# Patient Record
Sex: MALE | Race: WHITE | NOT HISPANIC OR LATINO | Employment: FULL TIME | URBAN - METROPOLITAN AREA
[De-identification: names, ages, dates, MRNs, and addresses within clinical notes are randomized per-mention and may not be internally consistent; named-entity substitution may affect disease eponyms.]

---

## 2020-02-08 ENCOUNTER — OFFICE VISIT (OUTPATIENT)
Dept: RETAIL CLINIC | Facility: CLINIC | Age: 55
End: 2020-02-08

## 2020-02-08 DIAGNOSIS — H61.23 BILATERAL IMPACTED CERUMEN: ICD-10-CM

## 2020-02-08 DIAGNOSIS — H65.03 BILATERAL ACUTE SEROUS OTITIS MEDIA, RECURRENCE NOT SPECIFIED: Primary | ICD-10-CM

## 2020-02-08 PROCEDURE — 99203 OFFICE O/P NEW LOW 30 MIN: CPT | Performed by: NURSE PRACTITIONER

## 2020-02-08 RX ORDER — AZITHROMYCIN 250 MG/1
TABLET, FILM COATED ORAL
Qty: 6 TABLET | Refills: 0 | Status: SHIPPED | OUTPATIENT
Start: 2020-02-08 | End: 2022-03-16

## 2020-02-08 NOTE — PROGRESS NOTES
Subjective   Elan Freed is a 54 y.o. male.     History of Present Illness   Presents with bilateral ear fullness right > left, hard of hearing and ringing in ears for over 2 weeks. He has not tried anything for relief and he denies fever. He does have nasal congestion and nasal discharge which is thick and yellow at times.   The following portions of the patient's history were reviewed and updated as appropriate: allergies, current medications, past family history, past medical history, past surgical history and problem list.    Review of Systems   Constitutional: Negative.    HENT: Positive for ear pain (bilateral ear fullness, ringing and mild pain.), hearing loss, postnasal drip and rhinorrhea. Negative for ear discharge, facial swelling, sinus pressure, sinus pain and sore throat.    Eyes: Negative.    Skin: Negative.    Allergic/Immunologic: Negative.    Neurological: Negative.    Hematological: Negative.    Psychiatric/Behavioral: Negative.        Objective   Physical Exam   Constitutional: He is oriented to person, place, and time. Vital signs are normal. He appears well-developed and well-nourished. He is cooperative.  Non-toxic appearance. He does not have a sickly appearance.   HENT:   Head: Normocephalic and atraumatic.   Right Ear: External ear normal. Tympanic membrane is erythematous. A middle ear effusion is present. Decreased hearing is noted.   Left Ear: External ear normal. Tympanic membrane is erythematous. A middle ear effusion is present. Decreased hearing is noted.   Ears:    Nose: Mucosal edema and rhinorrhea present. Right sinus exhibits no maxillary sinus tenderness and no frontal sinus tenderness. Left sinus exhibits no maxillary sinus tenderness and no frontal sinus tenderness.   Mouth/Throat: Oropharynx is clear and moist and mucous membranes are normal. No oropharyngeal exudate, posterior oropharyngeal edema, posterior oropharyngeal erythema or tonsillar abscesses. Tonsils are 0 on the  right. Tonsils are 0 on the left.   Nasal congestion    Eyes: Conjunctivae are normal.   Cardiovascular: Normal rate, regular rhythm and normal heart sounds.   No murmur heard.  Pulmonary/Chest: Effort normal and breath sounds normal.   Lymphadenopathy:     He has no cervical adenopathy.   Neurological: He is alert and oriented to person, place, and time.   Skin: Skin is warm and dry.   Nursing note and vitals reviewed.      Assessment/Plan   Elan was seen today for ear fullness and hearing problem.    Diagnoses and all orders for this visit:    Bilateral acute serous otitis media, recurrence not specified    Bilateral impacted cerumen  -     Ear Cerumen Removal    Other orders  -     azithromycin (ZITHROMAX) 250 MG tablet; Take 2 tablets the first day, then 1 tablet daily for 4 days.        Ear Cerumen Removal  Date/Time: 2/8/2020 12:46 PM  Performed by: Palma Knott APRN  Authorized by: Palma Knott APRN   Consent: Verbal consent obtained.  Risks and benefits: risks, benefits and alternatives were discussed  Consent given by: patient  Patient understanding: patient states understanding of the procedure being performed  Patient consent: the patient's understanding of the procedure matches consent given  Procedure consent: procedure consent matches procedure scheduled  Relevant documents: relevant documents present and verified  Test results: test results available and properly labeled    Anesthesia:  Local Anesthetic: none  Location: both.  Patient tolerance: Patient tolerated the procedure well with no immediate complications  Comments: Both ear were cleared of cerumen and TM's visualized. Bilateral  TM's dull and erythremic.   Procedure type: instrumentation and irrigation   Sedation:  Patient sedated: no         follow up prn no improvement in 5-7 days   Follow up with PCP if hearing does not improve.   Recommend Flonase nasal spray as directed.   Claritin 10 mg. Po tran.   Coricidin HBP as directed  prn nasal congestion    Palma Knott, APRN

## 2020-02-08 NOTE — PATIENT INSTRUCTIONS

## 2022-03-16 ENCOUNTER — HOSPITAL ENCOUNTER (EMERGENCY)
Facility: HOSPITAL | Age: 57
Discharge: HOME | End: 2022-03-16
Attending: EMERGENCY MEDICINE | Admitting: EMERGENCY MEDICINE

## 2022-03-16 ENCOUNTER — APPOINTMENT (OUTPATIENT)
Dept: CT IMAGING | Facility: HOSPITAL | Age: 57
End: 2022-03-16

## 2022-03-16 ENCOUNTER — APPOINTMENT (OUTPATIENT)
Dept: CARDIOLOGY | Facility: HOSPITAL | Age: 57
End: 2022-03-16

## 2022-03-16 VITALS
RESPIRATION RATE: 18 BRPM | HEIGHT: 70 IN | SYSTOLIC BLOOD PRESSURE: 172 MMHG | BODY MASS INDEX: 26.48 KG/M2 | WEIGHT: 185 LBS | HEART RATE: 83 BPM | TEMPERATURE: 98.2 F | DIASTOLIC BLOOD PRESSURE: 100 MMHG | OXYGEN SATURATION: 96 %

## 2022-03-16 DIAGNOSIS — M79.605 LEFT LEG PAIN: ICD-10-CM

## 2022-03-16 DIAGNOSIS — Z86.16 HISTORY OF COVID-19: ICD-10-CM

## 2022-03-16 DIAGNOSIS — R10.12 LEFT UPPER QUADRANT ABDOMINAL PAIN: Primary | ICD-10-CM

## 2022-03-16 DIAGNOSIS — R59.1 LYMPHADENOPATHY: ICD-10-CM

## 2022-03-16 LAB
ALBUMIN SERPL-MCNC: 4.6 G/DL (ref 3.5–5.2)
ALBUMIN/GLOB SERPL: 1.8 G/DL
ALP SERPL-CCNC: 44 U/L (ref 39–117)
ALT SERPL W P-5'-P-CCNC: 27 U/L (ref 1–41)
ANION GAP SERPL CALCULATED.3IONS-SCNC: 10 MMOL/L (ref 5–15)
AST SERPL-CCNC: 22 U/L (ref 1–40)
BASOPHILS # BLD MANUAL: 0 10*3/MM3 (ref 0–0.2)
BASOPHILS NFR BLD MANUAL: 0 % (ref 0–1.5)
BH CV LOWER VASCULAR LEFT COMMON FEMORAL AUGMENT: NORMAL
BH CV LOWER VASCULAR LEFT COMMON FEMORAL COMPRESS: NORMAL
BH CV LOWER VASCULAR LEFT COMMON FEMORAL PHASIC: NORMAL
BH CV LOWER VASCULAR LEFT COMMON FEMORAL SPONT: NORMAL
BH CV LOWER VASCULAR LEFT DISTAL FEMORAL COMPRESS: NORMAL
BH CV LOWER VASCULAR LEFT GASTRONEMIUS COMPRESS: NORMAL
BH CV LOWER VASCULAR LEFT GREATER SAPH AK COMPRESS: NORMAL
BH CV LOWER VASCULAR LEFT GREATER SAPH BK COMPRESS: NORMAL
BH CV LOWER VASCULAR LEFT LESSER SAPH COMPRESS: NORMAL
BH CV LOWER VASCULAR LEFT MID FEMORAL AUGMENT: NORMAL
BH CV LOWER VASCULAR LEFT MID FEMORAL COMPRESS: NORMAL
BH CV LOWER VASCULAR LEFT MID FEMORAL PHASIC: NORMAL
BH CV LOWER VASCULAR LEFT MID FEMORAL SPONT: NORMAL
BH CV LOWER VASCULAR LEFT PERONEAL COMPRESS: NORMAL
BH CV LOWER VASCULAR LEFT POPLITEAL AUGMENT: NORMAL
BH CV LOWER VASCULAR LEFT POPLITEAL COMPRESS: NORMAL
BH CV LOWER VASCULAR LEFT POPLITEAL PHASIC: NORMAL
BH CV LOWER VASCULAR LEFT POPLITEAL SPONT: NORMAL
BH CV LOWER VASCULAR LEFT POSTERIOR TIBIAL COMPRESS: NORMAL
BH CV LOWER VASCULAR LEFT PROFUNDA FEMORAL COMPRESS: NORMAL
BH CV LOWER VASCULAR LEFT PROXIMAL FEMORAL COMPRESS: NORMAL
BH CV LOWER VASCULAR LEFT SAPHENOFEMORAL JUNCTION COMPRESS: NORMAL
BH CV LOWER VASCULAR RIGHT COMMON FEMORAL AUGMENT: NORMAL
BH CV LOWER VASCULAR RIGHT COMMON FEMORAL COMPRESS: NORMAL
BH CV LOWER VASCULAR RIGHT COMMON FEMORAL PHASIC: NORMAL
BH CV LOWER VASCULAR RIGHT COMMON FEMORAL SPONT: NORMAL
BILIRUB SERPL-MCNC: 0.3 MG/DL (ref 0–1.2)
BUN SERPL-MCNC: 16 MG/DL (ref 6–20)
BUN/CREAT SERPL: 17.4 (ref 7–25)
CALCIUM SPEC-SCNC: 9 MG/DL (ref 8.6–10.5)
CHLORIDE SERPL-SCNC: 105 MMOL/L (ref 98–107)
CO2 SERPL-SCNC: 26 MMOL/L (ref 22–29)
CREAT SERPL-MCNC: 0.92 MG/DL (ref 0.76–1.27)
DEPRECATED RDW RBC AUTO: 43.4 FL (ref 37–54)
EGFRCR SERPLBLD CKD-EPI 2021: 97.6 ML/MIN/1.73
EOSINOPHIL # BLD MANUAL: 0 10*3/MM3 (ref 0–0.4)
EOSINOPHIL NFR BLD MANUAL: 0 % (ref 0.3–6.2)
ERYTHROCYTE [DISTWIDTH] IN BLOOD BY AUTOMATED COUNT: 12.6 % (ref 12.3–15.4)
GLOBULIN UR ELPH-MCNC: 2.5 GM/DL
GLUCOSE SERPL-MCNC: 98 MG/DL (ref 65–99)
HCT VFR BLD AUTO: 42.1 % (ref 37.5–51)
HGB BLD-MCNC: 14.7 G/DL (ref 13–17.7)
LIPASE SERPL-CCNC: 35 U/L (ref 13–60)
LYMPHOCYTES # BLD MANUAL: 9.96 10*3/MM3 (ref 0.7–3.1)
LYMPHOCYTES NFR BLD MANUAL: 2 % (ref 5–12)
MAXIMAL PREDICTED HEART RATE: 164 BPM
MCH RBC QN AUTO: 32.8 PG (ref 26.6–33)
MCHC RBC AUTO-ENTMCNC: 34.9 G/DL (ref 31.5–35.7)
MCV RBC AUTO: 94 FL (ref 79–97)
MONOCYTES # BLD: 0.27 10*3/MM3 (ref 0.1–0.9)
NEUTROPHILS # BLD AUTO: 3.41 10*3/MM3 (ref 1.7–7)
NEUTROPHILS NFR BLD MANUAL: 23 % (ref 42.7–76)
NEUTS BAND NFR BLD MANUAL: 2 % (ref 0–5)
PLAT MORPH BLD: NORMAL
PLATELET # BLD AUTO: 209 10*3/MM3 (ref 140–450)
PMV BLD AUTO: 10.1 FL (ref 6–12)
POTASSIUM SERPL-SCNC: 4 MMOL/L (ref 3.5–5.2)
PROT SERPL-MCNC: 7.1 G/DL (ref 6–8.5)
RBC # BLD AUTO: 4.48 10*6/MM3 (ref 4.14–5.8)
RBC MORPH BLD: NORMAL
SMUDGE CELLS BLD QL SMEAR: ABNORMAL
SODIUM SERPL-SCNC: 141 MMOL/L (ref 136–145)
STRESS TARGET HR: 139 BPM
TROPONIN T SERPL-MCNC: <0.01 NG/ML (ref 0–0.03)
VARIANT LYMPHS NFR BLD MANUAL: 73 % (ref 19.6–45.3)
WBC NRBC COR # BLD: 13.65 10*3/MM3 (ref 3.4–10.8)

## 2022-03-16 PROCEDURE — 93971 EXTREMITY STUDY: CPT | Performed by: INTERNAL MEDICINE

## 2022-03-16 PROCEDURE — 93005 ELECTROCARDIOGRAM TRACING: CPT | Performed by: PHYSICIAN ASSISTANT

## 2022-03-16 PROCEDURE — 85025 COMPLETE CBC W/AUTO DIFF WBC: CPT | Performed by: PHYSICIAN ASSISTANT

## 2022-03-16 PROCEDURE — 99283 EMERGENCY DEPT VISIT LOW MDM: CPT

## 2022-03-16 PROCEDURE — 83690 ASSAY OF LIPASE: CPT | Performed by: PHYSICIAN ASSISTANT

## 2022-03-16 PROCEDURE — 85007 BL SMEAR W/DIFF WBC COUNT: CPT | Performed by: PHYSICIAN ASSISTANT

## 2022-03-16 PROCEDURE — 71275 CT ANGIOGRAPHY CHEST: CPT

## 2022-03-16 PROCEDURE — 93971 EXTREMITY STUDY: CPT

## 2022-03-16 PROCEDURE — 80053 COMPREHEN METABOLIC PANEL: CPT | Performed by: PHYSICIAN ASSISTANT

## 2022-03-16 PROCEDURE — 84484 ASSAY OF TROPONIN QUANT: CPT | Performed by: PHYSICIAN ASSISTANT

## 2022-03-16 PROCEDURE — 0 IOPAMIDOL PER 1 ML: Performed by: EMERGENCY MEDICINE

## 2022-03-16 RX ORDER — SODIUM CHLORIDE 0.9 % (FLUSH) 0.9 %
10 SYRINGE (ML) INJECTION AS NEEDED
Status: DISCONTINUED | OUTPATIENT
Start: 2022-03-16 | End: 2022-03-16 | Stop reason: HOSPADM

## 2022-03-16 RX ADMIN — IOPAMIDOL 80 ML: 755 INJECTION, SOLUTION INTRAVENOUS at 17:14

## 2022-03-16 NOTE — DISCHARGE INSTRUCTIONS
Rest.  Tylenol or Motrin as directed for pain.  Call [facility] to establish a PCP and arrange for follow up next week.  Return if worse.

## 2022-03-16 NOTE — ED PROVIDER NOTES
Start Aricept 5 mg one tablet with food daily for at least 30 days.    After 30 days, you can increase the dose to 10 mg daily with food.    Continue all of your other current medications (Lexapro, Losartan) as directed.    Return to clinic in 3 months for follow up appointment.      I will also schedule neuropsychological testing and a head MRI.    You should also follow up with Neurology.     Subjective   Mr. Freed is a 57 yr old male who was sent here by [facility] for further evaluation of left leg pain and left lower chest and left upper abdominal pain that began this morning.  He states he was positive for COVID 2 wks ago and [facility] was concerned he may have a PE and/or DVT.  The pt denies any cough or fever.  No shortness of breath.  No pleuritic pain.  He states his pain is mostly just below the ribs on the left.  When he has pain in the upper abdomen, his left calf and ankle ache at the same time.  He has no nausea, vomiting constipation, diarrhea or urinary symptoms.   No injury.  No known health issues.            Review of Systems   Constitutional: Negative for chills and fever.   HENT: Negative for sore throat.    Respiratory: Negative for cough and shortness of breath.    Cardiovascular: Positive for chest pain. Negative for palpitations and leg swelling.   Gastrointestinal: Positive for abdominal pain (LUQ).   Endocrine: Negative for polyuria.   Genitourinary: Negative for dysuria.   Musculoskeletal: Negative for back pain.        Left lower leg pain     Skin: Negative for rash.   Neurological: Negative for headaches.   Hematological: Negative.    Psychiatric/Behavioral: Negative.        No past medical history on file.    No Known Allergies    No past surgical history on file.    No family history on file.    Social History     Socioeconomic History   • Marital status:    Tobacco Use   • Smoking status: Former Smoker   • Smokeless tobacco: Current User     Types: Chew   • Tobacco comment: nicotine gum           Objective   Physical Exam  Constitutional:       General: He is not in acute distress.     Appearance: He is not ill-appearing, toxic-appearing or diaphoretic.   HENT:      Head: Normocephalic.      Nose: Nose normal.      Mouth/Throat:      Mouth: Mucous membranes are moist.   Eyes:      Conjunctiva/sclera: Conjunctivae normal.      Pupils: Pupils are equal, round, and reactive to  "light.   Cardiovascular:      Rate and Rhythm: Normal rate and regular rhythm.      Pulses: Normal pulses.      Heart sounds: Normal heart sounds. No murmur heard.  Pulmonary:      Effort: Pulmonary effort is normal.      Breath sounds: Normal breath sounds.   Chest:      Chest wall: No tenderness.   Abdominal:      General: Bowel sounds are normal.      Tenderness: There is no abdominal tenderness. There is no guarding or rebound.   Musculoskeletal:         General: No tenderness. Normal range of motion.      Cervical back: Normal range of motion and neck supple.      Right lower leg: No edema.      Left lower leg: No edema.      Comments: No current tenderness.  No swelling or redness.  Negative Patricio's   Skin:     General: Skin is warm and dry.   Neurological:      General: No focal deficit present.      Mental Status: He is alert.   Psychiatric:         Mood and Affect: Mood normal.         Procedures           ED Course      Pt appears in no distress.  No current symptoms.  Exam is benign.  I read his [facility] note and there is talk of chest pain into shoulder and left lower leg pain.  He was sent to r/o \"clot\".  Since he had COVID recently, I will get CTA chest and get doppler left leg and check labs.    17:40 EDT  Pt appears in no distress.  His doppler u/s is negative for DVT.  He has some non-specific lymph nodes in the groin.    CTA chest:  1.  Negative for pulmonary embolus.  2.  No acute cardiopulmonary process.  3.  Mild bilateral axillary lymphadenopathy. This is indeterminant. If  clinically warranted, these would appear amenable to percutaneous  Biopsy.    White count is 13.65, predominately lymphocytes.  No other concerning labs.      The lymphadenopathy may be related to his recent COVID positive status.  I will d/c home to follow up with his PCP for recheck next week.                                                     MDM    Final diagnoses:   Left upper quadrant abdominal pain   Left leg pain "   Lymphadenopathy   History of COVID-19       ED Disposition  ED Disposition     ED Disposition   Discharge    Condition   Stable    Comment   --             [facility] [place] [id number]  [phone]    call to establish a PCP and arrange for follow up next week    [facility] Emergency Department  [address] [facility] [address] [phone]    If symptoms worsen         Medication List      No changes were made to your prescriptions during this visit.          Andrew Rangel, PA  03/16/22 [id number]

## 2022-03-18 ENCOUNTER — OFFICE VISIT (OUTPATIENT)
Dept: INTERNAL MEDICINE | Facility: CLINIC | Age: 57
End: 2022-03-18

## 2022-03-18 VITALS
TEMPERATURE: 97.3 F | HEIGHT: 69 IN | HEART RATE: 88 BPM | BODY MASS INDEX: 28.71 KG/M2 | WEIGHT: 193.8 LBS | DIASTOLIC BLOOD PRESSURE: 88 MMHG | SYSTOLIC BLOOD PRESSURE: 136 MMHG | OXYGEN SATURATION: 98 %

## 2022-03-18 DIAGNOSIS — Z11.59 NEED FOR HEPATITIS C SCREENING TEST: ICD-10-CM

## 2022-03-18 DIAGNOSIS — Z12.5 SCREENING PSA (PROSTATE SPECIFIC ANTIGEN): ICD-10-CM

## 2022-03-18 DIAGNOSIS — F41.1 GAD (GENERALIZED ANXIETY DISORDER): Primary | Chronic | ICD-10-CM

## 2022-03-18 DIAGNOSIS — Z13.220 LIPID SCREENING: ICD-10-CM

## 2022-03-18 DIAGNOSIS — Z12.11 SCREEN FOR COLON CANCER: ICD-10-CM

## 2022-03-18 DIAGNOSIS — R59.0 AXILLARY LYMPHADENOPATHY: ICD-10-CM

## 2022-03-18 DIAGNOSIS — Z23 NEED FOR SHINGLES VACCINE: ICD-10-CM

## 2022-03-18 DIAGNOSIS — H93.13 TINNITUS OF BOTH EARS: ICD-10-CM

## 2022-03-18 DIAGNOSIS — U07.1 COVID-19 VIRUS INFECTION: ICD-10-CM

## 2022-03-18 DIAGNOSIS — R03.0 BORDERLINE HYPERTENSION: ICD-10-CM

## 2022-03-18 DIAGNOSIS — Z23 NEED FOR INFLUENZA VACCINATION: ICD-10-CM

## 2022-03-18 DIAGNOSIS — E66.3 OVERWEIGHT (BMI 25.0-29.9): ICD-10-CM

## 2022-03-18 PROBLEM — Z86.16 HISTORY OF COVID-19: Status: ACTIVE | Noted: 2022-03-18

## 2022-03-18 PROCEDURE — 90686 IIV4 VACC NO PRSV 0.5 ML IM: CPT | Performed by: STUDENT IN AN ORGANIZED HEALTH CARE EDUCATION/TRAINING PROGRAM

## 2022-03-18 PROCEDURE — 90472 IMMUNIZATION ADMIN EACH ADD: CPT | Performed by: STUDENT IN AN ORGANIZED HEALTH CARE EDUCATION/TRAINING PROGRAM

## 2022-03-18 PROCEDURE — 90750 HZV VACC RECOMBINANT IM: CPT | Performed by: STUDENT IN AN ORGANIZED HEALTH CARE EDUCATION/TRAINING PROGRAM

## 2022-03-18 PROCEDURE — 90471 IMMUNIZATION ADMIN: CPT | Performed by: STUDENT IN AN ORGANIZED HEALTH CARE EDUCATION/TRAINING PROGRAM

## 2022-03-18 PROCEDURE — 99215 OFFICE O/P EST HI 40 MIN: CPT | Performed by: STUDENT IN AN ORGANIZED HEALTH CARE EDUCATION/TRAINING PROGRAM

## 2022-03-18 RX ORDER — SERTRALINE HYDROCHLORIDE 25 MG/1
25 TABLET, FILM COATED ORAL DAILY
Qty: 30 TABLET | Refills: 1 | Status: SHIPPED | OUTPATIENT
Start: 2022-03-18 | End: 2022-04-25 | Stop reason: SDUPTHER

## 2022-03-18 RX ORDER — HYDROXYZINE HYDROCHLORIDE 25 MG/1
12.5-25 TABLET, FILM COATED ORAL 3 TIMES DAILY PRN
Qty: 60 TABLET | Refills: 1 | Status: SHIPPED | OUTPATIENT
Start: 2022-03-18 | End: 2022-04-25 | Stop reason: SDUPTHER

## 2022-03-18 NOTE — PROGRESS NOTES
Chief Complaint  Elan Freed is a 57 y.o. male presenting for elavated BP ([facility]) and Anxiety ([facility]  3/16/22).     From [place]. Lives with wife. Has son born 1992. Has own business - building swimming pools since 1987.     Patient has a past medical history of JACK and uncomplicated COVID-19.    History of Present Illness  Patient is here to establish care.  He has not had a primary care physician for long time.    Patient was found to have elevated blood pressure in the emergency room and urgent care.  He would like it rechecked.    Patient states he has a history of anxiety, but it has always been fairly mild.  More recently he has been experiencing worsening anxiety over the last 2 months.  Sometimes difficult to fall asleep, he continues to worry.  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  He states several family members have a history of anxiety.  He did try a quarter size Valium the other day from his brother, that he feels helped.  He prefers not to be on any medications, unless necessary.    Patient would also like colon cancer screening colonoscopy.  He would also like to do cholesterol check and prostate cancer screening PSA.    Patient also has a history of smoking, quit smoking last year, he smoked for about 25 years on average 1 pack a day.    Of note patient was diagnosed with COVID-19 2 weeks ago today, he had symptoms for 3-4 days prior to testing.  He is now recovering well, he did have some loose bowels that resolved a couple of days ago.  He was also evaluated in the emergency room to rule out DVT/PE, and was found to have axillary lymphadenopathy bilaterally.  Of note patient also noticed 1-2 weeks of tinnitus of his ears.  He has been exposed to loud noise frequently and has worked operating machinery.  He has not been using hearing protection.    The following portions of the patient's history were reviewed and updated as  "appropriate: allergies, current medications, past family history, past medical history, past social history, past surgical history and problem list.    PHQ-2 Total Score: 0    Objective  /88 (BP Location: Left arm, Patient Position: Sitting, Cuff Size: Adult)   Pulse 88   Temp 97.3 °F (36.3 °C) (Temporal)   Ht 176 cm (69.29\")   Wt 87.9 kg (193 lb 12.8 oz)   SpO2 98%   BMI 28.38 kg/m²     Physical Exam  Vitals reviewed.   Constitutional:       Appearance: Normal appearance.   HENT:      Head: Normocephalic and atraumatic.      Right Ear: Tympanic membrane, ear canal and external ear normal. There is no impacted cerumen.      Left Ear: Tympanic membrane, ear canal and external ear normal. There is no impacted cerumen.      Nose: No congestion.   Eyes:      Extraocular Movements: Extraocular movements intact.      Conjunctiva/sclera: Conjunctivae normal.   Cardiovascular:      Rate and Rhythm: Normal rate and regular rhythm.      Heart sounds: Normal heart sounds. No murmur heard.  Pulmonary:      Effort: Pulmonary effort is normal.      Breath sounds: Normal breath sounds.   Abdominal:      General: There is no distension.      Palpations: Abdomen is soft. There is no mass.      Tenderness: There is no abdominal tenderness.   Musculoskeletal:      Cervical back: Neck supple.      Right lower leg: No edema.      Left lower leg: No edema.   Skin:     General: Skin is warm and dry.   Neurological:      Mental Status: He is alert and oriented to person, place, and time. Mental status is at baseline.   Psychiatric:         Behavior: Behavior normal.         Thought Content: Thought content normal.         Assessment/Plan   1. JACK (generalized anxiety disorder)  PHQ-9 Total Score: 0  Anxiety that is affecting function.  No history of or concern for depression.  It appears to be partially related to stressors in his work, but he also has a family history of anxiety disorder.  He does not use alcohol.  Does not " use any drugs.  I recommend considering low-dose sertraline for at least a period of time to see if that can help.  He can also try Atarax for as needed use.  I have counseled him on side effects including GI and erectile dysfunction, and if any significant side effects we should consider other medication.  I have counseled him on recommendations for physical exercise, that can help reduce tension.  Also regular sleep schedule, doing things with family and friends to socialize.  He will return in about 5 weeks for follow-up visit/annual and we will see how this is working for him.  We will also do blood work as ordered.  - sertraline (Zoloft) 25 MG tablet; Take 1 tablet by mouth Daily.  Dispense: 30 tablet; Refill: 1  - hydrOXYzine (ATARAX) 25 MG tablet; Take 0.5-1 tablets by mouth 3 (Three) Times a Day As Needed for Anxiety (or sleep).  Dispense: 60 tablet; Refill: 1  - TSH Rfx On Abnormal To Free T4; Future    2. COVID-19 virus infection  Appears to be recovering well.    3. Axillary lymphadenopathy  Might consider repeat scan/ultrasound to see that the adenopathy resolves, I suspect this is in setting of his current/recent Covid infection.    4. Tinnitus of both ears  May be in setting of working in noisy environment, I strongly encouraged him to use hearing protection.  It may also be in setting of Covid.  We will see if this changes over the next few weeks.    5. Screening PSA (prostate specific antigen)  Pros and cons of screening discussed, including potential for false positive and work-up of elevated level that may prove to be no cancer versus early detection of a treatable and curable prostate cancer.  No family history of prostate cancer.  Patient would like to proceed with PSA screening.  - PSA Screen; Future    6. Screen for colon cancer  Family history of colon cancer, patient never had colonoscopy in the past.  He would like to start colon cancer screening with colonoscopy.  - Amb referral for  Screening Colonoscopy    7. Need for hepatitis C screening test  We will screen as recommended.  - Hepatitis C Antibody; Future    8. Lipid screening  Patient will return for fasting lipids.  - Lipid Panel; Future    9. Need for shingles vaccine  Administered today dose 1/2.  Next dose would be due in 2-6 months.  - Shingrix Vaccine    10. Need for influenza vaccination  Administered today.  - FluLaval/Fluarix/Fluzone >6 Months    11. Overweight (BMI 25.0-29.9)  Patient's Body mass index is 28.38 kg/m². indicating that he is overweight (BMI 25-29.9). Patient's (Body mass index is 28.38 kg/m².) indicates that they are overweight with health conditions that include none . Weight is newly identified. BMI is is above average; BMI management plan is completed. We discussed increasing exercise. .    12. Borderline hypertension  BP Readings from Last 3 Encounters:   03/18/22 136/88   03/16/22 172/100   03/16/22 172/100   Since high blood pressures may be in setting of acute illness.  He is currently still borderline.  We will recheck on follow-up visit.    Total time spent on chart review, charting and face-to-face with patient 65 minutes.    Return in about 5 weeks (around 4/22/2022) for Annual physical.    Future Appointments       Provider Department Center    4/22/2022 10:30 AM Cj Bermudez MD [facility] INTERNAL MEDICINE [facility]          Cj Bermudez MD  Family Medicine  03/18/2022

## 2022-03-21 LAB
QT INTERVAL: 408 MS
QTC INTERVAL: 420 MS

## 2022-03-28 DIAGNOSIS — Z12.11 SCREENING FOR COLON CANCER: Primary | ICD-10-CM

## 2022-04-25 ENCOUNTER — OFFICE VISIT (OUTPATIENT)
Dept: INTERNAL MEDICINE | Facility: CLINIC | Age: 57
End: 2022-04-25

## 2022-04-25 VITALS
TEMPERATURE: 98.6 F | SYSTOLIC BLOOD PRESSURE: 134 MMHG | WEIGHT: 196.2 LBS | BODY MASS INDEX: 29.06 KG/M2 | HEART RATE: 76 BPM | DIASTOLIC BLOOD PRESSURE: 82 MMHG | HEIGHT: 69 IN

## 2022-04-25 DIAGNOSIS — R03.0 BORDERLINE HYPERTENSION: ICD-10-CM

## 2022-04-25 DIAGNOSIS — F41.1 GAD (GENERALIZED ANXIETY DISORDER): Chronic | ICD-10-CM

## 2022-04-25 DIAGNOSIS — Z00.00 WELL ADULT EXAM: Primary | ICD-10-CM

## 2022-04-25 DIAGNOSIS — E66.3 OVERWEIGHT (BMI 25.0-29.9): ICD-10-CM

## 2022-04-25 DIAGNOSIS — N52.2 DRUG-INDUCED ERECTILE DYSFUNCTION: ICD-10-CM

## 2022-04-25 PROCEDURE — 99396 PREV VISIT EST AGE 40-64: CPT | Performed by: STUDENT IN AN ORGANIZED HEALTH CARE EDUCATION/TRAINING PROGRAM

## 2022-04-25 RX ORDER — SERTRALINE HYDROCHLORIDE 25 MG/1
25 TABLET, FILM COATED ORAL DAILY
Qty: 30 TABLET | Refills: 1 | Status: SHIPPED | OUTPATIENT
Start: 2022-04-25 | End: 2023-02-27 | Stop reason: HOSPADM

## 2022-04-25 RX ORDER — HYDROXYZINE HYDROCHLORIDE 25 MG/1
12.5-25 TABLET, FILM COATED ORAL 3 TIMES DAILY PRN
Qty: 60 TABLET | Refills: 1 | Status: SHIPPED | OUTPATIENT
Start: 2022-04-25 | End: 2022-10-23 | Stop reason: SDUPTHER

## 2022-04-25 NOTE — PROGRESS NOTES
"Chief Complaint  Elan Freed is a 57 y.o. male presenting for Annual Exam, Fatigue, and URI (Fatigued, no energy ,  residual of Covid in Mar. ).     From [place]. Lives with wife. Has son born 1992. Has own business - building swimming pools since 1987.      Patient has a past medical history of JACK and uncomplicated COVID-19 (3/16/22).    History of Present Illness  Patient is here for annual.    Patient was diagnosed with COVID-19 on 3/16/2022, he is still having symptoms in form of fatigue, decreased taste and smell, but he is having good.  Symptoms of pain and further between the symptomatic episodes.    He is scheduled for colonoscopy Thursday this week, unfortunately his prep was not received at the pharmacy, so I am recommended he reaches out so they can resend it for him.    He feels that his anxiety has improved significantly after starting on Zoloft, he also has taken Atarax with good response as needed.  Unfortunately he has noticed some erectile dysfunction, but not to the point where he wants to stop the medication yet.    The following portions of the patient's history were reviewed and updated as appropriate: allergies, current medications, past family history, past medical history, past social history, past surgical history and problem list.    Objective  /82 (BP Location: Left arm, Patient Position: Sitting, Cuff Size: Adult)   Pulse 76   Temp 98.6 °F (37 °C) (Temporal)   Ht 175.3 cm (69\")   Wt 89 kg (196 lb 3.2 oz)   BMI 28.97 kg/m²     Physical Exam  Vitals reviewed.   Constitutional:       Appearance: Normal appearance.   HENT:      Head: Normocephalic and atraumatic.      Nose: No congestion.   Eyes:      Extraocular Movements: Extraocular movements intact.      Conjunctiva/sclera: Conjunctivae normal.   Cardiovascular:      Rate and Rhythm: Normal rate and regular rhythm.      Heart sounds: Normal heart sounds. No murmur heard.  Pulmonary:      Effort: Pulmonary effort is " normal.      Breath sounds: Normal breath sounds.   Musculoskeletal:      Cervical back: Neck supple.      Right lower leg: No edema.      Left lower leg: No edema.   Skin:     General: Skin is warm and dry.   Neurological:      Mental Status: He is alert and oriented to person, place, and time. Mental status is at baseline.   Psychiatric:         Behavior: Behavior normal.         Thought Content: Thought content normal.         Assessment/Plan   1. Well adult exam  Patient still having decreased energy after his COVID.  Counseled on recommendation for COVID-vaccine about 3 months after he was diagnosed.  He did not have vaccine in the past.  Patient did not go for blood work, he did not realize that he was supposed to come back for fasting blood work and thought they would call him for an appointment.  I told him he can go to any [facility] fasting and get the blood work done and I will update him when I get the results.  Patient will go for colonoscopy this week as planned.    2. JACK (generalized anxiety disorder)  3. Drug-induced erectile dysfunction  Significant improvement of anxiety, but unfortunately side effect of erectile dysfunction, likely related to sertraline.  Patient wants to continue on his current dose to see if this gets better.  He will return in 6 weeks for follow-up with me and we can see if we need to change to another medication at that time.  - sertraline (Zoloft) 25 MG tablet; Take 1 tablet by mouth Daily.  Dispense: 30 tablet; Refill: 1  - hydrOXYzine (ATARAX) 25 MG tablet; Take 0.5-1 tablets by mouth 3 (Three) Times a Day As Needed for Anxiety (or sleep).  Dispense: 60 tablet; Refill: 1    4. Borderline hypertension  BP Readings from Last 3 Encounters:   04/25/22 134/82   04/12/22 148/91   03/18/22 136/88   Blood pressure now below 140/90.  We will continue to monitor.    5. Overweight (BMI 25.0-29.9)  BMI is above normal parameters. Recommendations: exercise counseling/recommendations  and nutrition counseling/recommendations       Return in about 6 weeks (around 6/6/2022) for Recheck.    Future Appointments       Provider Department Center    4/28/2022 12:30 PM Mendoza Dye MD [facility] GASTROENTEROLOGY [facility]    6/8/2022 2:30 PM Cj Bermudez MD [facility] INTERNAL MEDICINE [facility]            Cj Bermudez MD  Family Medicine  04/25/2022

## 2022-04-28 ENCOUNTER — OUTSIDE FACILITY SERVICE (OUTPATIENT)
Dept: GASTROENTEROLOGY | Facility: CLINIC | Age: 57
End: 2022-04-28

## 2022-04-28 PROCEDURE — 45388 COLONOSCOPY W/ABLATION: CPT | Performed by: INTERNAL MEDICINE

## 2022-04-28 PROCEDURE — 88305 TISSUE EXAM BY PATHOLOGIST: CPT | Performed by: INTERNAL MEDICINE

## 2022-04-28 PROCEDURE — 45385 COLONOSCOPY W/LESION REMOVAL: CPT | Performed by: INTERNAL MEDICINE

## 2022-04-29 ENCOUNTER — TELEPHONE (OUTPATIENT)
Dept: GASTROENTEROLOGY | Facility: CLINIC | Age: 57
End: 2022-04-29

## 2022-04-29 ENCOUNTER — LAB REQUISITION (OUTPATIENT)
Dept: LAB | Facility: HOSPITAL | Age: 57
End: 2022-04-29

## 2022-04-29 DIAGNOSIS — Z12.11 ENCOUNTER FOR SCREENING FOR MALIGNANT NEOPLASM OF COLON: ICD-10-CM

## 2022-04-29 NOTE — TELEPHONE ENCOUNTER
Pain should resolve in the next 1 to 2 days.  Can follow-up bland diet.  If he has rectal bleeding (more than just a scant amount), he should present to the ED for evaluation.

## 2022-05-02 LAB
CYTO UR: NORMAL
LAB AP CASE REPORT: NORMAL
LAB AP CLINICAL INFORMATION: NORMAL
PATH REPORT.FINAL DX SPEC: NORMAL
PATH REPORT.GROSS SPEC: NORMAL

## 2022-05-03 ENCOUNTER — TELEPHONE (OUTPATIENT)
Dept: GASTROENTEROLOGY | Facility: CLINIC | Age: 57
End: 2022-05-03

## 2022-05-03 NOTE — TELEPHONE ENCOUNTER
----- Message from Mendoza Dye MD sent at 5/2/2022  3:46 PM EDT -----  Please let Elan know that the polyps were benign.  Because of the number I still recommend a follow-up in 3 years time.

## 2022-05-03 NOTE — TELEPHONE ENCOUNTER
I TRIED TO CALL [facility] TO GIVE BIOPSY RESULTS. NO ANSWER; MAILBOX IS FULL. I WILL SEND RESULT LETTER TO MY CHART AND THE MAIL.

## 2022-06-13 ENCOUNTER — OFFICE VISIT (OUTPATIENT)
Dept: INTERNAL MEDICINE | Facility: CLINIC | Age: 57
End: 2022-06-13

## 2022-06-13 VITALS
DIASTOLIC BLOOD PRESSURE: 86 MMHG | WEIGHT: 200 LBS | HEIGHT: 69 IN | BODY MASS INDEX: 29.62 KG/M2 | TEMPERATURE: 97.8 F | HEART RATE: 72 BPM | SYSTOLIC BLOOD PRESSURE: 122 MMHG

## 2022-06-13 DIAGNOSIS — M25.511 ARTHRALGIA OF RIGHT ACROMIOCLAVICULAR JOINT: ICD-10-CM

## 2022-06-13 DIAGNOSIS — F41.1 GAD (GENERALIZED ANXIETY DISORDER): Chronic | ICD-10-CM

## 2022-06-13 DIAGNOSIS — R59.0 AXILLARY LYMPHADENOPATHY: ICD-10-CM

## 2022-06-13 DIAGNOSIS — R93.89 ABNORMAL CT OF THE CHEST: ICD-10-CM

## 2022-06-13 DIAGNOSIS — N52.2 DRUG-INDUCED ERECTILE DYSFUNCTION: ICD-10-CM

## 2022-06-13 DIAGNOSIS — M65.331 TRIGGER MIDDLE FINGER OF RIGHT HAND: Primary | ICD-10-CM

## 2022-06-13 DIAGNOSIS — R03.0 BORDERLINE HYPERTENSION: ICD-10-CM

## 2022-06-13 PROCEDURE — 99214 OFFICE O/P EST MOD 30 MIN: CPT | Performed by: STUDENT IN AN ORGANIZED HEALTH CARE EDUCATION/TRAINING PROGRAM

## 2022-06-13 NOTE — PROGRESS NOTES
"Chief Complaint  Elan Freed is a 57 y.o. male presenting for Hypertension (Borderline  f/u ) and Pain (Right shoulder and hand also numbness in arm).     From [place]. Lives with wife. Has son born 1992. Has own business - building swimming pools since 1987. Twin brother passed unexpectedly May 2022.     Patient has a past medical history of JACK and uncomplicated COVID-19 (3/16/22).    History of Present Illness  Twin brother passed unexpectedly May 2022.  Patient is upset and shocked about his mother's unexpected passing, they found him dead in the day.    He states his sleep has been affected, he is more anxious.  However he feels that Zoloft has helped him well, but still having some erectile dysfunction.  He would rather continue on Zoloft for now.  He also uses Atarax as needed to help control anxiety and sleep.    Patient has noticed right and middle and partially index finger catching.  He has a history of trigger finger from the past.  He would like to see hand surgeon for evaluation/injection.    Over last 3-4 months he has also been experiencing worsening right shoulder pain.  Hurts when laying on.  No injury.  Painful when moving.    The following portions of the patient's history were reviewed and updated as appropriate: allergies, current medications, past family history, past medical history, past social history, past surgical history and problem list.    Objective  /86 (BP Location: Left arm, Patient Position: Sitting, Cuff Size: Adult)   Pulse 72   Temp 97.8 °F (36.6 °C) (Temporal)   Ht 175.4 cm (69.04\")   Wt 90.7 kg (200 lb)   BMI 29.50 kg/m²     Physical Exam  Vitals reviewed.   Constitutional:       Appearance: Normal appearance.   HENT:      Head: Normocephalic and atraumatic.      Nose: No congestion.   Eyes:      Extraocular Movements: Extraocular movements intact.      Conjunctiva/sclera: Conjunctivae normal.   Cardiovascular:      Rate and Rhythm: Normal rate and regular " rhythm.      Heart sounds: Normal heart sounds. No murmur heard.  Pulmonary:      Effort: Pulmonary effort is normal.      Breath sounds: Normal breath sounds.   Chest:   Breasts:      Right: No axillary adenopathy or supraclavicular adenopathy.      Left: No axillary adenopathy or supraclavicular adenopathy.       Abdominal:      General: There is no distension.      Palpations: Abdomen is soft. There is no mass.      Tenderness: There is no abdominal tenderness.   Musculoskeletal:         General: Tenderness present.      Cervical back: Neck supple.      Right lower leg: No edema.      Left lower leg: No edema.      Comments: Right shoulder: Tender on palpation around AC joint.  Adduction test causes pain around the AC joint.  He is able to elevate arm above the head.  He has fairly good ROM for external and internal rotation.   Lymphadenopathy:      Cervical: No cervical adenopathy.      Upper Body:      Right upper body: No supraclavicular or axillary adenopathy.      Left upper body: No supraclavicular or axillary adenopathy.   Skin:     General: Skin is warm and dry.   Neurological:      Mental Status: He is alert and oriented to person, place, and time. Mental status is at baseline.   Psychiatric:         Behavior: Behavior normal.         Thought Content: Thought content normal.         Assessment/Plan   1. Trigger middle finger of right hand  Will refer to hand surgery for evaluation.  - Ambulatory Referral to Hand Surgery    2. Arthralgia of right acromioclavicular joint  We will do x-ray.  Suspect possible osteoarthritis.  Will refer to Ortho for evaluation for possible injection.  - XR Shoulder 2+ View Right; Future  - Ambulatory Referral to Orthopedic Surgery    3. Borderline hypertension  BP Readings from Last 3 Encounters:   06/13/22 122/86   04/25/22 134/82   04/12/22 148/91   Blood pressure now improved.  We will continue to monitor.    4. JACK (generalized anxiety disorder)  5. Drug-induced erectile  dysfunction  Patient will continue on Zoloft for now.  He is experiencing side effects with the ED likely related to this medication, but would like to continue for now.  Recommend transitioning to another medication when he is interested..    6. Axillary lymphadenopathy  7. Abnormal CT of the chest  Demonstrated on CT chest in March.  Could be infectious, and I was not able to find any lymphadenopathy today.  We will do ultrasound to rule out any deeper ongoing lymphadenopathy.  If this is resolved he will not need any further follow-up for now.  - US Axilla Bilateral; Future      Return in about 3 months (around 9/13/2022) for Recheck.    Future Appointments       Provider Department Center    9/13/2022 10:30 AM Cj Bermudez MD [facility] INTERNAL MEDICINE [facility]          Cj Bermudez MD  Family Medicine  06/13/2022

## 2022-10-23 DIAGNOSIS — F41.1 GAD (GENERALIZED ANXIETY DISORDER): Chronic | ICD-10-CM

## 2022-10-24 RX ORDER — HYDROXYZINE HYDROCHLORIDE 25 MG/1
TABLET, FILM COATED ORAL
Qty: 60 TABLET | Refills: 1 | Status: SHIPPED | OUTPATIENT
Start: 2022-10-24 | End: 2023-02-27 | Stop reason: SDUPTHER

## 2022-10-24 RX ORDER — HYDROXYZINE HYDROCHLORIDE 25 MG/1
12.5-25 TABLET, FILM COATED ORAL 3 TIMES DAILY PRN
Qty: 60 TABLET | Refills: 1 | Status: SHIPPED | OUTPATIENT
Start: 2022-10-24 | End: 2022-10-24 | Stop reason: SDUPTHER

## 2022-10-24 NOTE — TELEPHONE ENCOUNTER
Rx Refill Note  Requested Prescriptions     Pending Prescriptions Disp Refills   • hydrOXYzine (ATARAX) 25 MG tablet 60 tablet 1     Sig: Take 0.5-1 tablets by mouth 3 (Three) Times a Day As Needed for Anxiety (or sleep).      Last office visit with prescribing clinician: 6/13/2022      Next office visit with prescribing clinician: 10/23/2022            Ana Garvey LPN  10/24/22, 08:46 EDT

## 2022-10-24 NOTE — TELEPHONE ENCOUNTER
Rx Refill Note  Requested Prescriptions     Pending Prescriptions Disp Refills   • hydrOXYzine (ATARAX) 25 MG tablet [Pharmacy Med Name: HYDROXYZINE HCL 25MG TABS (WHITE)] 60 tablet 1     Sig: TAKE 1/2 TO 1 TABLET BY MOUTH THREE TIMES DAILY AS NEEDED FOR ANXIETY OR SLEEP      Last office visit with prescribing clinician: 6/13/2022      Next office visit with prescribing clinician: 11/9/2022            Ana Garvey LPN  10/24/22, 08:53 EDT

## 2023-02-27 ENCOUNTER — OFFICE VISIT (OUTPATIENT)
Dept: INTERNAL MEDICINE | Facility: CLINIC | Age: 58
End: 2023-02-27
Payer: MEDICAID

## 2023-02-27 VITALS
HEART RATE: 72 BPM | HEIGHT: 70 IN | BODY MASS INDEX: 31.92 KG/M2 | TEMPERATURE: 98.2 F | SYSTOLIC BLOOD PRESSURE: 136 MMHG | WEIGHT: 223 LBS | DIASTOLIC BLOOD PRESSURE: 86 MMHG

## 2023-02-27 DIAGNOSIS — F41.1 GAD (GENERALIZED ANXIETY DISORDER): Chronic | ICD-10-CM

## 2023-02-27 DIAGNOSIS — R09.81 NASAL CONGESTION: ICD-10-CM

## 2023-02-27 DIAGNOSIS — M25.511 ARTHRALGIA OF RIGHT ACROMIOCLAVICULAR JOINT: ICD-10-CM

## 2023-02-27 DIAGNOSIS — J34.89 NASAL STENOSIS: Primary | ICD-10-CM

## 2023-02-27 PROCEDURE — 99214 OFFICE O/P EST MOD 30 MIN: CPT | Performed by: STUDENT IN AN ORGANIZED HEALTH CARE EDUCATION/TRAINING PROGRAM

## 2023-02-27 RX ORDER — AZELASTINE 1 MG/ML
2 SPRAY, METERED NASAL 2 TIMES DAILY
Qty: 30 ML | Refills: 2 | Status: SHIPPED | OUTPATIENT
Start: 2023-02-27

## 2023-02-27 RX ORDER — HYDROXYZINE HYDROCHLORIDE 25 MG/1
TABLET, FILM COATED ORAL
Qty: 60 TABLET | Refills: 1 | OUTPATIENT
Start: 2023-02-27

## 2023-02-27 RX ORDER — SERTRALINE HYDROCHLORIDE 25 MG/1
25 TABLET, FILM COATED ORAL DAILY
Qty: 30 TABLET | Refills: 1 | OUTPATIENT
Start: 2023-02-27

## 2023-02-27 RX ORDER — HYDROXYZINE HYDROCHLORIDE 25 MG/1
25 TABLET, FILM COATED ORAL EVERY 8 HOURS PRN
Qty: 60 TABLET | Refills: 4 | Status: SHIPPED | OUTPATIENT
Start: 2023-02-27

## 2023-02-27 NOTE — PROGRESS NOTES
"Chief Complaint  Elan Freed is a 57 y.o. male presenting for Nasal Congestion (Right side , SOB sometimes ).     From [place]. Lives with wife. Has son born 1992. Has own business - building swimming pools since 1987. Twin brother passed unexpectedly May 2022.     Patient has a past medical history of JACK and uncomplicated COVID-19 (3/16/22).    History of Present Illness  Patient is here for evaluation of right-sided nasal congestion and blockage for about 2 months.  He was in [place] for trip and symptoms improved, but recurred again when he returned to [place].  He was on 1 week course of antibiotics 2-3 weeks ago and felt better for a while, but then started having symptoms again.  He was also recommended to start taking Flonase, that he has been taking for about 2 weeks.  It has helped somewhat.  Symptoms are worse when laying down.  He also reports some pressure over his right frontal area.  He tells me he can easily inhale, but when exhaling it feels like a flap partially blocking the flow of air of his right nostril.    Patient continues with pain of his left shoulder.  He never got to do the x-ray last year.  He would like me to order a new one for him.    He lost his brother unexpectedly last year, and he has been struggling with anxieties.  He feels the hydroxyzine helps and would like refill    The following portions of the patient's history were reviewed and updated as appropriate: allergies, current medications, past family history, past medical history, past social history, past surgical history and problem list.    Objective  /86 (BP Location: Left arm, Patient Position: Sitting, Cuff Size: Adult)   Pulse 72   Temp 98.2 °F (36.8 °C) (Temporal)   Ht 177.8 cm (70\")   Wt 101 kg (223 lb)   BMI 32.00 kg/m²     Physical Exam  Constitutional:       Appearance: Normal appearance.   HENT:      Nose: Congestion present.      Comments: His mucous membranes are somewhat inflamed.  I am not " able to visualize any polyps, and the nasal passages are fairly narrow.  He sounds congested.     Mouth/Throat:      Mouth: Mucous membranes are moist.      Pharynx: Oropharynx is clear. No posterior oropharyngeal erythema.   Eyes:      Extraocular Movements: Extraocular movements intact.      Conjunctiva/sclera: Conjunctivae normal.   Pulmonary:      Effort: Pulmonary effort is normal. No respiratory distress.   Musculoskeletal:      Cervical back: Neck supple. No tenderness.   Lymphadenopathy:      Cervical: No cervical adenopathy.   Skin:     General: Skin is warm and dry.   Neurological:      Mental Status: He is alert and oriented to person, place, and time. Mental status is at baseline.   Psychiatric:         Behavior: Behavior normal.         Thought Content: Thought content normal.         Assessment/Plan   1. Nasal stenosis  2. Nasal congestion  New problem with feeling of blockage of the right side of his nose.  Recommend continuing on Flonase, will also add azelastine nasal spray.  He does have a history of smoking, quit smoking 2 years ago.  He has noticed any bleeding.  However due to persistent symptoms it is appropriate to refer to ENT to rule out polyps versus mass of the right side.  - azelastine (ASTELIN) 0.1 % nasal spray; 2 sprays into the nostril(s) as directed by provider 2 (Two) Times a Day. Use in each nostril as directed  Dispense: 30 mL; Refill: 2  - Ambulatory Referral to ENT (Otolaryngology)    3. JACK (generalized anxiety disorder)  Has gotten somewhat worse, but feels help with hydroxyzine.  If needed we can also increase the dose.  - hydrOXYzine (ATARAX) 25 MG tablet; Take 1 tablet by mouth Every 8 (Eight) Hours As Needed for Anxiety (and sleep).  Dispense: 60 tablet; Refill: 4    4. Arthralgia of right acromioclavicular joint  We ordered x-ray.  - XR Shoulder 2+ View Left; Future      Return in about 2 months (around 4/28/2023) for Annual physical.    Future Appointments       Provider  Department Center    5/2/2023 1:15 PM Cj Bermudez MD [facility] INTERNAL MEDICINE [facility]          Cj Bermudez MD  Family Medicine  02/27/2023

## 2023-05-23 DIAGNOSIS — F41.1 GAD (GENERALIZED ANXIETY DISORDER): Chronic | ICD-10-CM

## 2023-05-24 RX ORDER — HYDROXYZINE HYDROCHLORIDE 25 MG/1
TABLET, FILM COATED ORAL
Qty: 60 TABLET | Refills: 2 | Status: SHIPPED | OUTPATIENT
Start: 2023-05-24

## 2023-05-24 NOTE — TELEPHONE ENCOUNTER
Rx Refill Note  Requested Prescriptions     Pending Prescriptions Disp Refills   • hydrOXYzine (ATARAX) 25 MG tablet [Pharmacy Med Name: HYDROXYZINE HCL 25MG TABS (WHITE)] 60 tablet 4     Sig: TAKE 1/2 TO 1 TABLET BY MOUTH THREE TIMES DAILY AS NEEDED FOR ANXIETY OR SLEEP      Last office visit with prescribing clinician: 2/27/2023   Last telemedicine visit with prescribing clinician: Visit date not found   Next office visit with prescribing clinician: Visit date not found                         Would you like a call back once the refill request has been completed: [] Yes [] No    If the office needs to give you a call back, can they leave a voicemail: [] Yes [] No    Ana Garvey LPN  05/24/23, 07:42 EDT

## 2023-07-26 DIAGNOSIS — F41.1 GAD (GENERALIZED ANXIETY DISORDER): Chronic | ICD-10-CM

## 2023-07-26 RX ORDER — HYDROXYZINE HYDROCHLORIDE 25 MG/1
TABLET, FILM COATED ORAL
Qty: 60 TABLET | Refills: 2 | Status: SHIPPED | OUTPATIENT
Start: 2023-07-26

## 2023-07-26 NOTE — TELEPHONE ENCOUNTER
Rx Refill Note  Requested Prescriptions     Pending Prescriptions Disp Refills    hydrOXYzine (ATARAX) 25 MG tablet [Pharmacy Med Name: HYDROXYZINE HCL 25MG TABS (WHITE)] 60 tablet 2     Sig: TAKE 1/2 TO 1 TABLET BY MOUTH THREE TIMES DAILY AS NEEDED FOR ANXIETY OR SLEEP      Last office visit with prescribing clinician: 2/27/2023   Last telemedicine visit with prescribing clinician: Visit date not found   Next office visit with prescribing clinician: Visit date not found                         Would you like a call back once the refill request has been completed: [] Yes [] No    If the office needs to give you a call back, can they leave a voicemail: [] Yes [] No    Rogelio Guerra MA  07/26/23, 14:45 EDT

## 2023-10-31 ENCOUNTER — TELEPHONE (OUTPATIENT)
Dept: INTERNAL MEDICINE | Facility: CLINIC | Age: 58
End: 2023-10-31
Payer: MEDICAID

## 2023-10-31 NOTE — TELEPHONE ENCOUNTER
PATIENT REQUESTING NEW X-RAY ORDER FOR HIS SHOULDERS. HE HAD A PREVIOUS X-RAY THAT HE DID NOT COMPLETE. (REFER TO LETTER SENT 8/23/2023. PLEASE CALL PATIENT WHEN SENT.

## 2023-10-31 NOTE — TELEPHONE ENCOUNTER
I had previously ordered him for an x-ray of his shoulder.  When I saw him in February he asked for a new order, and clearly did not get it done.  He also was supposed to come in for follow-up.  So at this time I will not order a new x-ray until he has come in for follow-up.

## 2023-11-20 ENCOUNTER — LAB (OUTPATIENT)
Dept: LAB | Facility: HOSPITAL | Age: 58
End: 2023-11-20
Payer: COMMERCIAL

## 2023-11-20 ENCOUNTER — HOSPITAL ENCOUNTER (OUTPATIENT)
Dept: GENERAL RADIOLOGY | Facility: HOSPITAL | Age: 58
Discharge: HOME | End: 2023-11-20
Admitting: STUDENT IN AN ORGANIZED HEALTH CARE EDUCATION/TRAINING PROGRAM
Payer: COMMERCIAL

## 2023-11-20 ENCOUNTER — OFFICE VISIT (OUTPATIENT)
Dept: INTERNAL MEDICINE | Facility: CLINIC | Age: 58
End: 2023-11-20
Payer: COMMERCIAL

## 2023-11-20 VITALS
HEIGHT: 70 IN | HEART RATE: 68 BPM | WEIGHT: 236.6 LBS | BODY MASS INDEX: 33.87 KG/M2 | DIASTOLIC BLOOD PRESSURE: 94 MMHG | SYSTOLIC BLOOD PRESSURE: 152 MMHG | TEMPERATURE: 98.2 F

## 2023-11-20 DIAGNOSIS — Z23 NEED FOR INFLUENZA VACCINATION: ICD-10-CM

## 2023-11-20 DIAGNOSIS — R63.5 WEIGHT GAIN: ICD-10-CM

## 2023-11-20 DIAGNOSIS — Z13.220 LIPID SCREENING: ICD-10-CM

## 2023-11-20 DIAGNOSIS — Z11.59 NEED FOR HEPATITIS C SCREENING TEST: ICD-10-CM

## 2023-11-20 DIAGNOSIS — G89.29 CHRONIC PAIN OF BOTH SHOULDERS: ICD-10-CM

## 2023-11-20 DIAGNOSIS — F41.1 GAD (GENERALIZED ANXIETY DISORDER): Chronic | ICD-10-CM

## 2023-11-20 DIAGNOSIS — Z13.1 DIABETES MELLITUS SCREENING: ICD-10-CM

## 2023-11-20 DIAGNOSIS — F41.1 GAD (GENERALIZED ANXIETY DISORDER): ICD-10-CM

## 2023-11-20 DIAGNOSIS — I10 ESSENTIAL HYPERTENSION: Chronic | ICD-10-CM

## 2023-11-20 DIAGNOSIS — E66.9 OBESITY, CLASS I, BMI 30-34.9: ICD-10-CM

## 2023-11-20 DIAGNOSIS — Z87.891 PERSONAL HISTORY OF TOBACCO USE, PRESENTING HAZARDS TO HEALTH: ICD-10-CM

## 2023-11-20 DIAGNOSIS — M25.511 CHRONIC PAIN OF BOTH SHOULDERS: ICD-10-CM

## 2023-11-20 DIAGNOSIS — M25.512 CHRONIC PAIN OF BOTH SHOULDERS: ICD-10-CM

## 2023-11-20 DIAGNOSIS — Z00.00 WELL ADULT EXAM: Primary | ICD-10-CM

## 2023-11-20 DIAGNOSIS — K21.9 GASTROESOPHAGEAL REFLUX DISEASE WITHOUT ESOPHAGITIS: Chronic | ICD-10-CM

## 2023-11-20 PROBLEM — E66.811 OBESITY, CLASS I, BMI 30-34.9: Status: ACTIVE | Noted: 2022-03-18

## 2023-11-20 PROCEDURE — 83036 HEMOGLOBIN GLYCOSYLATED A1C: CPT

## 2023-11-20 PROCEDURE — 86803 HEPATITIS C AB TEST: CPT

## 2023-11-20 PROCEDURE — 84439 ASSAY OF FREE THYROXINE: CPT

## 2023-11-20 PROCEDURE — 73030 X-RAY EXAM OF SHOULDER: CPT

## 2023-11-20 PROCEDURE — 80053 COMPREHEN METABOLIC PANEL: CPT

## 2023-11-20 PROCEDURE — 84443 ASSAY THYROID STIM HORMONE: CPT

## 2023-11-20 PROCEDURE — 80061 LIPID PANEL: CPT

## 2023-11-20 RX ORDER — OMEPRAZOLE 40 MG/1
40 CAPSULE, DELAYED RELEASE ORAL DAILY
COMMUNITY
Start: 2023-11-05

## 2023-11-20 RX ORDER — NIFEDIPINE 30 MG/1
30 TABLET, EXTENDED RELEASE ORAL DAILY
Qty: 30 TABLET | Refills: 0 | Status: SHIPPED | OUTPATIENT
Start: 2023-11-20

## 2023-11-20 NOTE — PROGRESS NOTES
"Chief Complaint  Elan Freed is a 58 y.o. male presenting for Annual Exam and Shoulder x-ray .     From [place]. Lives with wife. Has son born 1992. Has own business - building swimming pools since 1987. Twin brother passed unexpectedly May 2022.     Patient has a past medical history of JACK, GERD and uncomplicated COVID-19 (3/16/22).    History of Present Illness  Patient is here for annual physical and follow-up.    I had previously ordered x-ray of his left shoulder 2 times, but he never got to get it done.  He has been busy working a lot.  He tells me his shoulders are bothering him on a regular basis.  Left side is worse.  No injury.  Constant pain.  He takes ibuprofen 400 mg on occasion.  He is able to raise arms above the head, mostly works with arms lower down.    Patient also is following with ENT, he is on Flonase and azelastine nasal spray for polyps of the right side, they are considering surgery.    Patient did quit smoking, he did LDCT lung cancer screening last year and would like to continue screening.  He did quit smoking almost 2 years ago, and has a 25-pack-year history.    Patient tells me his father has high blood pressure, patient has not been on blood pressure medication in the past.  He has not checked his blood pressures at home.  However he reports gaining about 40-50 pounds since he stopped smoking.  He would like to lose weight, and is interested in counseling for his diet.    The following portions of the patient's history were reviewed and updated as appropriate: allergies, current medications, past family history, past medical history, past social history, past surgical history, and problem list.    Objective  /94 (BP Location: Left arm, Patient Position: Sitting, Cuff Size: Large Adult)   Pulse 68   Temp 98.2 °F (36.8 °C) (Temporal)   Ht 178.5 cm (70.28\")   Wt 107 kg (236 lb 9.6 oz)   BMI 33.68 kg/m²     Physical Exam  Vitals reviewed.   Constitutional:       " Appearance: Normal appearance.   HENT:      Head: Normocephalic and atraumatic.      Nose: Nose normal. No congestion.      Mouth/Throat:      Mouth: Mucous membranes are moist.   Eyes:      Extraocular Movements: Extraocular movements intact.      Conjunctiva/sclera: Conjunctivae normal.   Cardiovascular:      Rate and Rhythm: Normal rate and regular rhythm.      Heart sounds: Normal heart sounds. No murmur heard.  Pulmonary:      Effort: Pulmonary effort is normal.      Breath sounds: Normal breath sounds.   Abdominal:      General: There is no distension.      Palpations: Abdomen is soft. There is no mass.      Tenderness: There is no abdominal tenderness.   Musculoskeletal:         General: No tenderness.      Cervical back: Neck supple.      Right lower leg: No edema.      Left lower leg: No edema.      Comments: Shoulders: Able to raise above head, but endorses some pain.  Fairly good ROM bilaterally, able to place hands on back.  Adduction test placing hand on opposite shoulder causes pain bilaterally around the AC joint.  Isometric testing for external rotation bilaterally causes some degree of pain, otherwise normal.   Skin:     General: Skin is warm and dry.   Neurological:      Mental Status: He is alert and oriented to person, place, and time. Mental status is at baseline.   Psychiatric:         Behavior: Behavior normal.         Thought Content: Thought content normal.         Assessment/Plan   1. Well adult exam  Counseled on recommendations for COVID vaccine, second dose of shingles vaccine and annual flu shot.  Patient would like flu shot today, he is thinking about second dose of shingles.  Counseled on recommendations for regular dental visits, he tries to go annually.    2. Essential hypertension  BP Readings from Last 3 Encounters:   11/20/23 152/94   02/27/23 136/86   01/27/23 153/94   Patient does meet criteria for hypertension.  At this point I recommend starting on medication.  Patient  amenable.  We will start nifedipine XL 30 mg daily.  Counseled on side effects including erectile dysfunction, lightheadedness.  If any significant side effects he will get back in touch, we will follow-up in 4 weeks.  - NIFEdipine XL (PROCARDIA XL) 30 MG 24 hr tablet; Take 1 tablet by mouth Daily.  Dispense: 30 tablet; Refill: 0    3. Chronic pain of both shoulders  Suspect arthritis, possible AC joint, also possible tendinitis.  Will refer for physical therapy.  He can continue using Advil/ibuprofen as needed.  If no improvement I would consider referring to Ortho.  - XR Shoulder 2+ View Bilateral (In Office)  - Ambulatory Referral to Physical Therapy Evaluate and treat    4. Gastroesophageal reflux disease without esophagitis  He does have a history of acid reflux, this does sometimes cause nightly symptoms.  He is not able to do without the omeprazole at this time.  - omeprazole (priLOSEC) 40 MG capsule; Take 1 capsule by mouth Daily.    5. JACK (generalized anxiety disorder)  Overall stable.  Doing well on current medications.  Hydroxyzine helps for sleep.  - TSH Rfx On Abnormal To Free T4; Future    6. Weight gain  Weight gain after he stopped smoking.  Will refer to nutrition for counseling  - Ambulatory Referral to Nutrition Services  - Comprehensive Metabolic Panel; Future    7. Need for influenza vaccination  Administered today  - Fluzone (or Fluarix & Flulaval for VFC) >6mos    8. Personal history of tobacco use, presenting hazards to health  Patient wants to continue screening for lung cancer.  - CT chest low dose wo; Future    9. Diabetes mellitus screening  - Hemoglobin A1c; Future    10. Lipid screening  Patient is fasting for lipids today  - Lipid Panel; Future    11. Need for hepatitis C screening test  We will screen as recommended  - Hepatitis C Antibody; Future    12. Obesity, Class I, BMI 30-34.9  BMI is >= 30 and <35. (Class 1 Obesity). The following options were offered after discussion;:  exercise counseling/recommendations, nutrition counseling/recommendations, and referral to a nutritionist    - Ambulatory Referral to Nutrition Services          Return in about 4 weeks (around 12/18/2023) for Recheck.    Future Appointments         Provider Department Center    11/20/2023 12:00 PM [facility] XR 1 [facility] XRAY AT [facility]    12/19/2023 1:45 PM Cj Bermudez MD [facility] MEDICAL [facility] INTERNAL MEDICINE [facility]              Cj Bermudez MD  Family Medicine  11/20/2023

## 2023-11-21 PROBLEM — E11.9 TYPE 2 DIABETES MELLITUS WITHOUT COMPLICATION, WITHOUT LONG-TERM CURRENT USE OF INSULIN: Chronic | Status: ACTIVE | Noted: 2023-11-21

## 2023-11-21 PROBLEM — E78.2 MIXED HYPERLIPIDEMIA: Chronic | Status: ACTIVE | Noted: 2023-11-21

## 2023-11-21 PROBLEM — R79.89 ELEVATED TSH: Status: ACTIVE | Noted: 2023-11-21

## 2023-11-21 PROBLEM — R74.01 ELEVATED ALT MEASUREMENT: Status: ACTIVE | Noted: 2023-11-21

## 2023-11-21 PROBLEM — Z86.16 HISTORY OF COVID-19: Status: RESOLVED | Noted: 2022-03-18 | Resolved: 2023-11-21

## 2023-11-21 LAB
ALBUMIN SERPL-MCNC: 4.6 G/DL (ref 3.5–5.2)
ALBUMIN/GLOB SERPL: 1.9 G/DL
ALP SERPL-CCNC: 63 U/L (ref 39–117)
ALT SERPL W P-5'-P-CCNC: 47 U/L (ref 1–41)
ANION GAP SERPL CALCULATED.3IONS-SCNC: 9.2 MMOL/L (ref 5–15)
AST SERPL-CCNC: 27 U/L (ref 1–40)
BILIRUB SERPL-MCNC: 0.2 MG/DL (ref 0–1.2)
BUN SERPL-MCNC: 15 MG/DL (ref 6–20)
BUN/CREAT SERPL: 13.8 (ref 7–25)
CALCIUM SPEC-SCNC: 9.7 MG/DL (ref 8.6–10.5)
CHLORIDE SERPL-SCNC: 106 MMOL/L (ref 98–107)
CHOLEST SERPL-MCNC: 220 MG/DL (ref 0–200)
CO2 SERPL-SCNC: 24.8 MMOL/L (ref 22–29)
CREAT SERPL-MCNC: 1.09 MG/DL (ref 0.76–1.27)
EGFRCR SERPLBLD CKD-EPI 2021: 79.2 ML/MIN/1.73
GLOBULIN UR ELPH-MCNC: 2.4 GM/DL
GLUCOSE SERPL-MCNC: 110 MG/DL (ref 65–99)
HBA1C MFR BLD: 6.7 % (ref 4.8–5.6)
HCV AB SER DONR QL: NORMAL
HDLC SERPL-MCNC: 37 MG/DL (ref 40–60)
LDLC SERPL CALC-MCNC: 158 MG/DL (ref 0–100)
LDLC/HDLC SERPL: 4.22 {RATIO}
POTASSIUM SERPL-SCNC: 4.2 MMOL/L (ref 3.5–5.2)
PROT SERPL-MCNC: 7 G/DL (ref 6–8.5)
SODIUM SERPL-SCNC: 140 MMOL/L (ref 136–145)
T4 FREE SERPL-MCNC: 1.08 NG/DL (ref 0.93–1.7)
TRIGL SERPL-MCNC: 135 MG/DL (ref 0–150)
TSH SERPL DL<=0.05 MIU/L-ACNC: 6.52 UIU/ML (ref 0.27–4.2)
VLDLC SERPL-MCNC: 25 MG/DL (ref 5–40)

## 2023-11-22 PROBLEM — M19.012 PRIMARY OSTEOARTHRITIS OF BOTH SHOULDERS: Chronic | Status: ACTIVE | Noted: 2023-11-22

## 2023-11-22 PROBLEM — M19.011 PRIMARY OSTEOARTHRITIS OF BOTH SHOULDERS: Chronic | Status: ACTIVE | Noted: 2023-11-22

## 2023-12-07 ENCOUNTER — HOSPITAL ENCOUNTER (OUTPATIENT)
Dept: NUTRITION | Facility: HOSPITAL | Age: 58
Setting detail: RECURRING SERIES
Discharge: HOME | End: 2023-12-07

## 2023-12-07 NOTE — CONSULTS
[facility]          Initial 60 Minute Nutrition Visit    Date: 2023   Patient Name: Elan Freed  : 1965   MRN: [id number]   Referring Provider: Cj Bermudez MD    Reason for Visit: wt loss  Visit Format: IP    Nutrition Assessment       Social History:   Social History     Socioeconomic History    Marital status:    Tobacco Use    Smoking status: Former     Packs/day: 1.00     Years: 25.00     Additional pack years: 0.00     Total pack years: 25.00     Types: Cigarettes     Start date: 1996     Quit date: 2021     Years since quittin.0     Passive exposure: Past    Smokeless tobacco: Never    Tobacco comments:     nicotine gum   Vaping Use    Vaping Use: Never used   Substance and Sexual Activity    Alcohol use: Not Currently     Comment: 1-2 days a year    Drug use: Never     Types: Marijuana     Comment: Quit     Sexual activity: Defer     Active Problem List:   Patient Active Problem List    Diagnosis     Primary osteoarthritis of both shoulders [M19.011, M19.012]     Type 2 diabetes mellitus without complication, without long-term current use of insulin [E11.9]     Elevated TSH [R79.89]     Elevated ALT measurement [R74.01]     Mixed hyperlipidemia [E78.2]     Gastroesophageal reflux disease without esophagitis [K21.9]     Drug-induced erectile dysfunction [N52.2]     Obesity, Class I, BMI 30-34.9 [E66.9]     JACK (generalized anxiety disorder) [F41.1]     Essential hypertension [I10]     Axillary lymphadenopathy [R59.0]       Current Medications:   Current Outpatient Medications:     azelastine (ASTELIN) 0.1 % nasal spray, 2 sprays into the nostril(s) as directed by provider 2 (Two) Times a Day. Use in each nostril as directed, Disp: 30 mL, Rfl: 2    fluticasone (FLONASE) 50 MCG/ACT nasal spray, 2 sprays into the nostril(s) as directed by provider Daily for 14 days., Disp: 9.9 mL, Rfl: 0    hydrOXYzine (ATARAX) 25 MG tablet, TAKE 1/2 TO  "1 TABLET BY MOUTH THREE TIMES DAILY AS NEEDED FOR ANXIETY OR SLEEP, Disp: 60 tablet, Rfl: 2    NIFEdipine XL (PROCARDIA XL) 30 MG 24 hr tablet, Take 1 tablet by mouth Daily., Disp: 30 tablet, Rfl: 0    omeprazole (priLOSEC) 40 MG capsule, Take 1 capsule by mouth Daily., Disp: , Rfl:     Labs: A1c: 6.7    Hunger Vital Sign Food Insecurity Assessment:  Within the past 12 months I/we worried whether our food would run out before I/we got money to buy more: no   Within the past 12 months the food I/we bought just didn't last and I/we didn't have money to get more: no   Use of food assistance programs (WIC, food stamps, food hansen) no       Food & Nutrition Related History       Food Allergies: none  Food Intolerances: none  Food Behavior: none  Nutrition Impact Symptoms:  none  Gastrointestinal conditions that impact intake or food choices: none  Details at home: n/a  Who prepares most meals: patient   Who does grocery shopping: patient   How many meals are purchased from fast food/sit down restaurants per week: 5  Difficulty chewin - Normal  Difficulty swallowin - Normal  Diet requirement related to personal preference or cultural belief: diabetic diet  History of eating disorder/disordered eating habits: None  Language/communication details: English  Barriers to learning: No barriers identified at this time    24 Hour Recall:   Time Food/beverages consumed   B Sausage, eggs   L Tuna Baker   D Chicken, steamed veg., salad                         Additional comments: Typically drinks water, soft drinks, and coffee    Anthropometrics      Height:   Ht Readings from Last 1 Encounters:   23 178.5 cm (70.28\")     Weight:   Wt Readings from Last 3 Encounters:   23 107 kg (236 lb 9.6 oz)   23 101 kg (223 lb)   23 83.9 kg (185 lb)     BMI: There is no height or weight on file to calculate BMI.   Weight Change: Patient stated that      Physical Activity         Physical activity comments: n/a "     Estimated Needs     Estimated Energy Needs: not discussed    Estimated Protein Needs: not discussed     Estimated Fluid Needs: not discussed     Discussion / Education      Pt is a 58-year-old male referred for weight loss. Patient states that he has gained approximately 50 pounds in the last year. He states that he is newly diagnosed with type 2 diabetes. He states that his biggest challenge is knowing what to eat and what not to eat. He typically eats three meals a day, if he skips a meal it would be lunch. He eats out for dinner almost every night (4-5x/wk). RD educated patient on the three macronutrients and what each do for our body. RD encouraged patient to not limit carbohydrates due to the risk of hypoglycemia. RD educated patient on consistent carbohydrate intake. RD discussed how to build a healthy plate by using the plate method. RD explained the importance of portion control and balanced meals. RD discussed limiting added sugar intake from soft drinks and other foods. RD discussed the importance of fiber for GI health and satiety. RD discussed healthy snack options and quick meal ideas. RD encouraged patient to reach out with any additional questions or concerns.     Assessment of patient engagement: Engaged    Measurement of understanding: Patient verbalized understanding    Resources Provided: 3 Macronutrients, Plate method handout, healthy snack ideas handouts, weight management packet     Goal (s)      Goal 1: Limit soft drinks and increase water consumption     Goal 2: Decrease fast food meals to 2 times per week         Plan of Care     PES Statement:   Food and nutrition related to knowledge deficit related to no prior nutrition education as evidenced by patient stating he does not know what to eat and what not to eat..     Follow Up Visit      Follow Up:   January 9th @ 11:30am    Total of 60 minutes spent with patient on nutrition counseling. Education based on Academy of Nutrition and  Dietetics guidelines. Patient was provided with RD's contact information. Thank you for this referral.

## 2023-12-19 ENCOUNTER — OFFICE VISIT (OUTPATIENT)
Dept: INTERNAL MEDICINE | Facility: CLINIC | Age: 58
End: 2023-12-19
Payer: COMMERCIAL

## 2023-12-19 VITALS
HEART RATE: 64 BPM | SYSTOLIC BLOOD PRESSURE: 148 MMHG | BODY MASS INDEX: 33.56 KG/M2 | HEIGHT: 70 IN | WEIGHT: 234.4 LBS | TEMPERATURE: 98.4 F | DIASTOLIC BLOOD PRESSURE: 94 MMHG

## 2023-12-19 DIAGNOSIS — I10 ESSENTIAL HYPERTENSION: Primary | Chronic | ICD-10-CM

## 2023-12-19 DIAGNOSIS — K21.9 GASTROESOPHAGEAL REFLUX DISEASE WITHOUT ESOPHAGITIS: Chronic | ICD-10-CM

## 2023-12-19 DIAGNOSIS — E11.9 TYPE 2 DIABETES MELLITUS WITHOUT COMPLICATION, WITHOUT LONG-TERM CURRENT USE OF INSULIN: Chronic | ICD-10-CM

## 2023-12-19 DIAGNOSIS — F41.1 GAD (GENERALIZED ANXIETY DISORDER): Chronic | ICD-10-CM

## 2023-12-19 LAB
EXPIRATION DATE: NORMAL
Lab: NORMAL
POC CREATININE URINE: NORMAL
POC MICROALBUMIN URINE: NORMAL

## 2023-12-19 RX ORDER — METFORMIN HYDROCHLORIDE 500 MG/1
500 TABLET, EXTENDED RELEASE ORAL
Qty: 90 TABLET | Refills: 0 | Status: SHIPPED | OUTPATIENT
Start: 2023-12-19

## 2023-12-19 RX ORDER — NIFEDIPINE 60 MG/1
60 TABLET, FILM COATED, EXTENDED RELEASE ORAL DAILY
Qty: 90 TABLET | Refills: 0 | Status: SHIPPED | OUTPATIENT
Start: 2023-12-19

## 2023-12-19 RX ORDER — OMEPRAZOLE 20 MG/1
20 CAPSULE, DELAYED RELEASE ORAL DAILY
Qty: 90 CAPSULE | Refills: 0 | Status: SHIPPED | OUTPATIENT
Start: 2023-12-19

## 2023-12-19 RX ORDER — HYDROXYZINE HYDROCHLORIDE 25 MG/1
12.5-5 TABLET, FILM COATED ORAL NIGHTLY PRN
Qty: 90 TABLET | Refills: 1 | Status: SHIPPED | OUTPATIENT
Start: 2023-12-19

## 2023-12-19 NOTE — PROGRESS NOTES
"Chief Complaint  Elan Freed is a 58 y.o. male presenting for Hypertension.     From [place]. Lives with wife. Has son born 1992. Has own business - building swimming pools since 1987. Twin brother passed unexpectedly May 2022.     Patient has a past medical history of hypertension, T2DM (11/2023), JACK, GERD and uncomplicated COVID-19 (3/16/22).    History of Present Illness  Patient is here for follow-up.    We did start him on nifedipine, he has tolerated well.  He does keep an eye on his blood pressures at home, typically ranging around 140-155/85-95.    Patient also has a new diagnosis of type 2 diabetes with A1c at 6.7.  He has stopped drinking soft drinks and seems to do well.    His left shoulder still bothering him, but it has gotten better with physical therapy.    He is scheduled for nasal polyp surgery right side on February 1, 2024.    The following portions of the patient's history were reviewed and updated as appropriate: allergies, current medications, past family history, past medical history, past social history, past surgical history, and problem list.    Objective  /94 (BP Location: Left arm, Patient Position: Sitting, Cuff Size: Large Adult)   Pulse 64   Temp 98.4 °F (36.9 °C) (Temporal)   Ht 178.5 cm (70.28\")   Wt 106 kg (234 lb 6.4 oz)   BMI 33.37 kg/m²     Physical Exam  Constitutional:       Appearance: Normal appearance.   HENT:      Head: Normocephalic and atraumatic.   Eyes:      Extraocular Movements: Extraocular movements intact.      Conjunctiva/sclera: Conjunctivae normal.   Pulmonary:      Effort: Pulmonary effort is normal. No respiratory distress.   Musculoskeletal:      Cervical back: Neck supple.   Neurological:      Mental Status: He is alert and oriented to person, place, and time. Mental status is at baseline.   Psychiatric:         Behavior: Behavior normal.         Thought Content: Thought content normal.         Assessment/Plan   1. Essential " hypertension  BP Readings from Last 3 Encounters:   12/19/23 148/94   11/20/23 152/94   02/27/23 136/86   Blood pressure slightly improved, still not at goal.  Will increase nifedipine from 30 mg to 60 mg.  Follow-up in about 4 weeks.  - NIFEdipine XL (ADALAT CC) 60 MG 24 hr tablet; Take 1 tablet by mouth Daily.  Dispense: 90 tablet; Refill: 0    2. Type 2 diabetes mellitus without complication, without long-term current use of insulin  Hemoglobin A1C   Date Value Ref Range Status   11/20/2023 6.70 (H) 4.80 - 5.60 % Final   New diagnosis of T2DM.  Will try low-dose metformin extended release to see if he tolerates.  Counseled on side effects including gastrointestinal like nausea, loose bowels or diarrhea.  With any significant symptoms he would have to stop the medication.  Also counseled on exercise.  - POCT microalbumin  - metFORMIN ER (GLUCOPHAGE-XR) 500 MG 24 hr tablet; Take 1 tablet by mouth Daily With Breakfast.  Dispense: 90 tablet; Refill: 0    3. JACK (generalized anxiety disorder)  Stable.  Requesting refill.  - hydrOXYzine (ATARAX) 25 MG tablet; Take 0.5-2 tablets by mouth At Night As Needed for Anxiety (or sleep).  Dispense: 90 tablet; Refill: 1    4. Gastroesophageal reflux disease without esophagitis  Patient was off omeprazole 40 mg for 2-3 days and got recurrence of acid reflux.  Will try 20 mg dose to see.  If he gets recurrence on the lower dose I would increase again to 40 mg.  - omeprazole (priLOSEC) 20 MG capsule; Take 1 capsule by mouth Daily.  Dispense: 90 capsule; Refill: 0      Return in about 4 weeks (around 1/16/2024) for Recheck.    Future Appointments         Provider Department Center    12/27/2023 1:45 PM [facility] CT 1 [facility] CT AT [facility]    1/9/2024 11:30 AM Romel Lanza RD [facility] NUTRIT [facility]VC [facility]    1/19/2024 1:45 PM Cj Bermudez MD [facility]NAL MEDIC[facility]            Cj Bermudez MD  [facility]  Medicine  12/19/2023

## 2023-12-27 DIAGNOSIS — I10 ESSENTIAL HYPERTENSION: Chronic | ICD-10-CM

## 2023-12-27 RX ORDER — NIFEDIPINE 30 MG/1
30 TABLET, EXTENDED RELEASE ORAL DAILY
Qty: 30 TABLET | Refills: 0 | OUTPATIENT
Start: 2023-12-27

## 2024-01-09 ENCOUNTER — HOSPITAL ENCOUNTER (OUTPATIENT)
Dept: NUTRITION | Facility: HOSPITAL | Age: 59
Setting detail: RECURRING SERIES
Discharge: HOME | End: 2024-01-09

## 2024-01-09 NOTE — PROGRESS NOTES
Adult Outpatient Nutrition  Assessment/PES    Patient Name:  Elan Freed  YOB: 1965  MRN: [id number]    Assessment Date:  1/9/2024    Comments:  Patient was seen today for free follow up visit referred for weight loss. RD discussed outcomes of goals that were set at initial visit. Patients goal was to limit soft drinks and to consume more water. He states that he is doing much better with this. States that he has cut out sugar sweetened beverages almost entirely and only drinks water and unsweet tea. This second goal was to decrease fast food consumption to twice a week. He states that he has cut this down to about once a week and that he is cooking more. He states that he has lost 8 pounds since initial visit. He stated that he did not have any questions regarding the information discussed during initial visit. Patient did not want any new information at this time. Patient scheduled a follow up for February 20th. RYLAND encouraged patient to call if needed.                                    Electronically signed by:  Romel Lanza RD  01/09/24 11:46 EST

## 2024-01-19 ENCOUNTER — OFFICE VISIT (OUTPATIENT)
Dept: INTERNAL MEDICINE | Facility: CLINIC | Age: 59
End: 2024-01-19
Payer: COMMERCIAL

## 2024-01-19 VITALS
TEMPERATURE: 97.7 F | HEART RATE: 80 BPM | BODY MASS INDEX: 33.36 KG/M2 | SYSTOLIC BLOOD PRESSURE: 138 MMHG | HEIGHT: 70 IN | WEIGHT: 233 LBS | DIASTOLIC BLOOD PRESSURE: 84 MMHG

## 2024-01-19 DIAGNOSIS — E11.9 TYPE 2 DIABETES MELLITUS WITHOUT COMPLICATION, WITHOUT LONG-TERM CURRENT USE OF INSULIN: Chronic | ICD-10-CM

## 2024-01-19 DIAGNOSIS — I10 ESSENTIAL HYPERTENSION: Primary | Chronic | ICD-10-CM

## 2024-01-19 RX ORDER — LISINOPRIL 10 MG/1
10 TABLET ORAL DAILY
Qty: 30 TABLET | Refills: 2 | Status: SHIPPED | OUTPATIENT
Start: 2024-01-19

## 2024-01-19 NOTE — PROGRESS NOTES
"Chief Complaint  Elan Freed is a 58 y.o. male presenting for Hypertension.     From [place]. Lives with wife. Has son born 1992. Has own business - building swimming pools since 1987. Twin brother passed unexpectedly May 2022.     Patient has a past medical history of hypertension, T2DM (11/2023), JACK, GERD and uncomplicated COVID-19 (3/16/22).    History of Present Illness  Patient is here for follow-up.    He is overall doing well, he has not had any issues with increasing the dose of nifedipine XR to 60 mg.  No swelling of the ankles.  This home BP ranges around 140-160/90s.    Patient is scheduled for lung cancer screening CT next week.  He quit smoking in 2021.    The following portions of the patient's history were reviewed and updated as appropriate: allergies, current medications, past family history, past medical history, past social history, past surgical history, and problem list.    Objective  /84 (BP Location: Left arm, Patient Position: Sitting, Cuff Size: Large Adult)   Pulse 80   Temp 97.7 °F (36.5 °C)   Ht 178.5 cm (70.28\")   Wt 106 kg (233 lb)   BMI 33.17 kg/m²     Physical Exam  Constitutional:       Appearance: Normal appearance.   HENT:      Head: Normocephalic and atraumatic.   Eyes:      Extraocular Movements: Extraocular movements intact.      Conjunctiva/sclera: Conjunctivae normal.   Cardiovascular:      Pulses:           Dorsalis pedis pulses are 2+ on the right side and 2+ on the left side.        Posterior tibial pulses are 2+ on the right side and 2+ on the left side.   Pulmonary:      Effort: Pulmonary effort is normal. No respiratory distress.   Musculoskeletal:      Cervical back: Neck supple.      Right lower leg: No edema.      Left lower leg: No edema.      Right foot: Normal range of motion.      Left foot: Normal range of motion.   Feet:      Right foot:      Protective Sensation: 10 sites tested.  10 sites sensed.      Skin integrity: Skin integrity normal. " No ulcer or skin breakdown.      Toenail Condition: Right toenails are normal.      Left foot:      Protective Sensation: 10 sites tested.  10 sites sensed.      Skin integrity: Skin integrity normal. No ulcer or skin breakdown.      Toenail Condition: Left toenails are normal.      Comments: Diabetic Foot Exam Performed and Monofilament Test Performed  Normal  Skin:     General: Skin is warm and dry.   Neurological:      Mental Status: He is alert and oriented to person, place, and time. Mental status is at baseline.   Psychiatric:         Behavior: Behavior normal.         Thought Content: Thought content normal.         Assessment/Plan   1. Essential hypertension  BP Readings from Last 3 Encounters:   01/19/24 138/84   12/19/23 148/94   11/20/23 152/94   Blood pressure is still not at goal, with elevated home blood pressures, borderline high blood pressure in the office.  Due to his diabetes I recommend adding lisinopril to his regimen.  Counseled on side effects including low blood pressure/lightheadedness.  Lisinopril can also cause a dry cough, which would be a reason to change to another medication.  He will go for repeat blood work before next visit in about 5 weeks.  - lisinopril (PRINIVIL,ZESTRIL) 10 MG tablet; Take 1 tablet by mouth Daily.  Dispense: 30 tablet; Refill: 2  - Basic Metabolic Panel; Future    2. Type 2 diabetes mellitus without complication, without long-term current use of insulin  Hemoglobin A1C   Date Value Ref Range Status   11/20/2023 6.70 (H) 4.80 - 5.60 % Final   New diagnosis.  Normal foot exam today.  Counseled on recommendations for annual eye doctor exam to monitor for diabetic retinopathy.  Recommend he goes to an optometrist for an eye exam and let them know he has diabetes.  - MicroAlbumin, Urine, Random - Urine, Clean Catch; Future  - Hemoglobin A1c; Future      Return in about 5 weeks (around 2/23/2024) for Recheck.    Future Appointments         Provider Department Center     1/22/2024 1:00 PM [facility] CT 1 [facility] CT AT [facility]    2/20/2024 11:30 AM Romel Lanza RD [facility] NUTRIT [facility]    2/23/2024 1:45 PM Cj Bermudez MD [facility] INTERNAL MEDICINE [facility]              Cj Bermudez MD  Family Medicine  01/19/2024

## 2024-01-22 ENCOUNTER — HOSPITAL ENCOUNTER (OUTPATIENT)
Dept: CT IMAGING | Facility: HOSPITAL | Age: 59
Discharge: HOME | End: 2024-01-22
Admitting: STUDENT IN AN ORGANIZED HEALTH CARE EDUCATION/TRAINING PROGRAM
Payer: COMMERCIAL

## 2024-01-22 DIAGNOSIS — Z87.891 PERSONAL HISTORY OF TOBACCO USE, PRESENTING HAZARDS TO HEALTH: ICD-10-CM

## 2024-01-22 PROCEDURE — 71271 CT THORAX LUNG CANCER SCR C-: CPT

## 2024-01-24 ENCOUNTER — TELEPHONE (OUTPATIENT)
Dept: INTERNAL MEDICINE | Facility: CLINIC | Age: 59
End: 2024-01-24

## 2024-01-24 NOTE — TELEPHONE ENCOUNTER
There are no results back yet.  I will usually release the results with my comments as soon as the results are in.

## 2024-01-24 NOTE — TELEPHONE ENCOUNTER
Caller: Elan Freed    Relationship: Self    Best call back number: [phone]    Caller requesting test results: PATIENT/ ELAN    What test was performed: CT SCAN    When was the test performed: [id number]    Where was the test performed: [facility]    Additional notes:

## 2024-01-25 DIAGNOSIS — Z11.4 ENCOUNTER FOR SCREENING FOR HIV: ICD-10-CM

## 2024-01-25 DIAGNOSIS — R59.0 MEDIASTINAL LYMPHADENOPATHY: Primary | ICD-10-CM

## 2024-01-25 DIAGNOSIS — R59.0 AXILLARY LYMPHADENOPATHY: ICD-10-CM

## 2024-01-25 DIAGNOSIS — E11.9 TYPE 2 DIABETES MELLITUS WITHOUT COMPLICATION, WITHOUT LONG-TERM CURRENT USE OF INSULIN: Chronic | ICD-10-CM

## 2024-01-25 NOTE — Clinical Note
Melia -can you see if we can get him in with pulmonology within 1-2 weeks.  Concern for enlarged lymph nodes in the chest and axilla on CT scan.  Rule out cancer

## 2024-01-25 NOTE — PROGRESS NOTES
Call patient regarding CT results of his chest, showing mildly enlarged lymph nodes around the mediastinum, and also increased lymph node size of the axilla, largest on the right side about 17x21 mm on R side.  This can be concerning for slow-growing cancer like CLL or lymphoma, but also noncancerous.  He has had episodic night sweats for 20 years, but not on a daily basis, no worsening symptoms.  No fever.  He has been able to lose 7 pounds intentionally by cutting back on carbs for his diabetes, but his appetite is good.    Patient will come in for blood work as ordered.  Consented to HIV testing.  Will refer to pulmonology for evaluation and possible biopsy.    1. Mediastinal lymphadenopathy  - Comprehensive Metabolic Panel; Future  - Sedimentation Rate; Future  - Lactate Dehydrogenase; Future  - Angiotensin Converting Enzyme; Future  - CBC & Differential; Future  - Ambulatory Referral to Pulmonology    2. Axillary lymphadenopathy  - Ambulatory Referral to Pulmonology    3. Encounter for screening for HIV  Verbally consented  - HIV-1 / O / 2 Ag / Antibody; Future    4. Type 2 diabetes mellitus without complication, without long-term current use of insulin  - Hemoglobin A1c; Future  - MicroAlbumin, Urine, Random - Urine, Clean Catch; Future    Future Appointments         Provider Department Center    2/20/2024 11:30 AM Romel Lanza RD [facility]    2/23/2024 1:45 PM Cj Bermudez MD [facility] INTERNAL MEDICINE [facility] Bermudez MD

## 2024-01-26 ENCOUNTER — TELEPHONE (OUTPATIENT)
Dept: INTERNAL MEDICINE | Facility: CLINIC | Age: 59
End: 2024-01-26
Payer: COMMERCIAL

## 2024-01-26 NOTE — TELEPHONE ENCOUNTER
HUB TO READ:    LVM ASKING PT TO RETURN MY CALL.        PT NEEDS AN APPT NEXT WEEK WITH DR TRAN TO GO OVER CT RESULTS.

## 2024-01-29 ENCOUNTER — LAB (OUTPATIENT)
Dept: LAB | Facility: HOSPITAL | Age: 59
End: 2024-01-29
Payer: COMMERCIAL

## 2024-01-29 DIAGNOSIS — Z11.4 ENCOUNTER FOR SCREENING FOR HIV: ICD-10-CM

## 2024-01-29 DIAGNOSIS — E11.9 TYPE 2 DIABETES MELLITUS WITHOUT COMPLICATION, WITHOUT LONG-TERM CURRENT USE OF INSULIN: Chronic | ICD-10-CM

## 2024-01-29 DIAGNOSIS — R59.0 MEDIASTINAL LYMPHADENOPATHY: ICD-10-CM

## 2024-01-29 LAB
ALBUMIN SERPL-MCNC: 4.7 G/DL (ref 3.5–5.2)
ALBUMIN UR-MCNC: <1.2 MG/DL
ALBUMIN/GLOB SERPL: 1.7 G/DL
ALP SERPL-CCNC: 54 U/L (ref 39–117)
ALT SERPL W P-5'-P-CCNC: 42 U/L (ref 1–41)
ANION GAP SERPL CALCULATED.3IONS-SCNC: 16.3 MMOL/L (ref 5–15)
AST SERPL-CCNC: 24 U/L (ref 1–40)
BILIRUB SERPL-MCNC: 0.4 MG/DL (ref 0–1.2)
BUN SERPL-MCNC: 25 MG/DL (ref 6–20)
BUN/CREAT SERPL: 21 (ref 7–25)
CALCIUM SPEC-SCNC: 9.5 MG/DL (ref 8.6–10.5)
CHLORIDE SERPL-SCNC: 100 MMOL/L (ref 98–107)
CO2 SERPL-SCNC: 21.7 MMOL/L (ref 22–29)
CREAT SERPL-MCNC: 1.19 MG/DL (ref 0.76–1.27)
DEPRECATED RDW RBC AUTO: 43.6 FL (ref 37–54)
EGFRCR SERPLBLD CKD-EPI 2021: 71.2 ML/MIN/1.73
ERYTHROCYTE [DISTWIDTH] IN BLOOD BY AUTOMATED COUNT: 13.1 % (ref 12.3–15.4)
ERYTHROCYTE [SEDIMENTATION RATE] IN BLOOD: 7 MM/HR (ref 0–20)
GLOBULIN UR ELPH-MCNC: 2.7 GM/DL
GLUCOSE SERPL-MCNC: 117 MG/DL (ref 65–99)
HBA1C MFR BLD: 6.7 % (ref 4.8–5.6)
HCT VFR BLD AUTO: 47.4 % (ref 37.5–51)
HGB BLD-MCNC: 15.9 G/DL (ref 13–17.7)
HIV 1+2 AB+HIV1 P24 AG SERPL QL IA: NORMAL
LDH SERPL-CCNC: 197 U/L (ref 135–225)
MCH RBC QN AUTO: 30.4 PG (ref 26.6–33)
MCHC RBC AUTO-ENTMCNC: 33.5 G/DL (ref 31.5–35.7)
MCV RBC AUTO: 90.6 FL (ref 79–97)
PLATELET # BLD AUTO: 205 10*3/MM3 (ref 140–450)
PMV BLD AUTO: 10.6 FL (ref 6–12)
POTASSIUM SERPL-SCNC: 4.2 MMOL/L (ref 3.5–5.2)
PROT SERPL-MCNC: 7.4 G/DL (ref 6–8.5)
RBC # BLD AUTO: 5.23 10*6/MM3 (ref 4.14–5.8)
SODIUM SERPL-SCNC: 138 MMOL/L (ref 136–145)
WBC NRBC COR # BLD AUTO: 24.5 10*3/MM3 (ref 3.4–10.8)

## 2024-01-29 PROCEDURE — 85025 COMPLETE CBC W/AUTO DIFF WBC: CPT

## 2024-01-29 PROCEDURE — 85007 BL SMEAR W/DIFF WBC COUNT: CPT

## 2024-01-29 PROCEDURE — 85652 RBC SED RATE AUTOMATED: CPT

## 2024-01-29 PROCEDURE — G0432 EIA HIV-1/HIV-2 SCREEN: HCPCS

## 2024-01-29 PROCEDURE — 83036 HEMOGLOBIN GLYCOSYLATED A1C: CPT

## 2024-01-29 PROCEDURE — 83615 LACTATE (LD) (LDH) ENZYME: CPT

## 2024-01-29 PROCEDURE — 82043 UR ALBUMIN QUANTITATIVE: CPT

## 2024-01-29 PROCEDURE — 36415 COLL VENOUS BLD VENIPUNCTURE: CPT

## 2024-01-29 PROCEDURE — 82164 ANGIOTENSIN I ENZYME TEST: CPT

## 2024-01-29 PROCEDURE — 80053 COMPREHEN METABOLIC PANEL: CPT

## 2024-01-30 ENCOUNTER — OFFICE VISIT (OUTPATIENT)
Dept: INTERNAL MEDICINE | Facility: CLINIC | Age: 59
End: 2024-01-30
Payer: COMMERCIAL

## 2024-01-30 VITALS
DIASTOLIC BLOOD PRESSURE: 80 MMHG | WEIGHT: 226.54 LBS | HEART RATE: 88 BPM | TEMPERATURE: 98.2 F | BODY MASS INDEX: 32.43 KG/M2 | SYSTOLIC BLOOD PRESSURE: 136 MMHG | HEIGHT: 70 IN

## 2024-01-30 DIAGNOSIS — R59.0 MEDIASTINAL LYMPHADENOPATHY: Primary | ICD-10-CM

## 2024-01-30 DIAGNOSIS — D72.820 LYMPHOCYTOSIS: ICD-10-CM

## 2024-01-30 DIAGNOSIS — R59.0 AXILLARY LYMPHADENOPATHY: ICD-10-CM

## 2024-01-30 DIAGNOSIS — E11.9 TYPE 2 DIABETES MELLITUS WITHOUT COMPLICATION, WITHOUT LONG-TERM CURRENT USE OF INSULIN: Chronic | ICD-10-CM

## 2024-01-30 DIAGNOSIS — I10 ESSENTIAL HYPERTENSION: Chronic | ICD-10-CM

## 2024-01-30 LAB
EOSINOPHIL # BLD MANUAL: 0.76 10*3/MM3 (ref 0–0.4)
EOSINOPHIL NFR BLD MANUAL: 3.1 % (ref 0.3–6.2)
LYMPHOCYTES # BLD MANUAL: 17.93 10*3/MM3 (ref 0.7–3.1)
LYMPHOCYTES NFR BLD MANUAL: 7.2 % (ref 5–12)
MONOCYTES # BLD: 1.76 10*3/MM3 (ref 0.1–0.9)
NEUTROPHILS # BLD AUTO: 4.04 10*3/MM3 (ref 1.7–7)
NEUTROPHILS NFR BLD MANUAL: 16.5 % (ref 42.7–76)
PLAT MORPH BLD: NORMAL
RBC MORPH BLD: NORMAL
SMUDGE CELLS BLD QL SMEAR: ABNORMAL
VARIANT LYMPHS NFR BLD MANUAL: 73.2 % (ref 19.6–45.3)

## 2024-01-30 PROCEDURE — 99214 OFFICE O/P EST MOD 30 MIN: CPT | Performed by: STUDENT IN AN ORGANIZED HEALTH CARE EDUCATION/TRAINING PROGRAM

## 2024-01-30 NOTE — PROGRESS NOTES
Chief Complaint  Elan Freed is a 58 y.o. male presenting for Results (CT).     From [place]. Lives with wife. Has son born 1992. Has own business - building swimming pools since 1987. Twin brother passed unexpectedly May 2022.     Patient has a past medical history of hypertension, T2DM (11/2023), JACK, GERD and uncomplicated COVID-19 (3/16/22).    History of Present Illness  Patient is here for follow-up after his CT scan and blood work that was done yesterday, indicating lymphocytosis and mild lymphadenopathy.  There is concern for possible chronic lymphocytic leukemia, but he would need further evaluation by hematology to establish diagnosis.    I have reached out to heme-onc Dr. Mcdonough, who feels that at this point we do not need to pursue biopsy of his lymphadenopathy.  If this proves to be CLL his prognosis is good, they might not even start treatment yet, and typically this will have normal life expectancy.  Reassuringly his hemoglobin and platelets are normal, there is no bulky lymphadenopathy.    Patient has been very concerned after reading talked a few days ago, but is now reassured with this new information, despite possibility for slow-growing cancer.    Of note he is still working on weight loss.  His appetite is good.  He has not consumed any soft drinks since last year.  He has been able to lose about 11 pounds, and would like to get down to about 175 pounds.    He is also having sinus surgery on 2/29/2024, he has a polyp of the right nasal cavity that continues to cause congestion and blockage of the airflow.    The following portions of the patient's history were reviewed and updated as appropriate: allergies, current medications, past family history, past medical history, past social history, past surgical history, and problem list.    Objective  /80 (BP Location: Left arm, Patient Position: Sitting, Cuff Size: Large Adult)   Pulse 88   Temp 98.2 °F (36.8 °C) (Temporal)   Ht 178.5  "cm (70.28\")   Wt 103 kg (226 lb 8.6 oz)   BMI 32.25 kg/m²     Physical Exam  Constitutional:       Appearance: Normal appearance.   HENT:      Head: Normocephalic and atraumatic.   Eyes:      Extraocular Movements: Extraocular movements intact.      Conjunctiva/sclera: Conjunctivae normal.   Pulmonary:      Effort: Pulmonary effort is normal. No respiratory distress.   Musculoskeletal:      Cervical back: Neck supple.   Neurological:      Mental Status: He is alert and oriented to person, place, and time. Mental status is at baseline.   Psychiatric:         Behavior: Behavior normal.         Thought Content: Thought content normal.         Assessment/Plan   1. Mediastinal lymphadenopathy  2. Axillary lymphadenopathy  3. Lymphocytosis  Will refer to hematology as discussed.  Reassurance provided.  I think at this point we can defer biopsy and pulmonology evaluation given results of his CBC.  I have asked patient to call the pulmonology office to cancel the appointment.  I have offered him follow-up at any point, for now he wants to postpone his upcoming appointment, and we will reschedule in about 2 months.  I have encouraged him to send me [employer] messages if he has any concern, or come and see me sooner if needed.  - Ambulatory Referral to Hematology    4. Type 2 diabetes mellitus without complication, without long-term current use of insulin  Hemoglobin A1C   Date Value Ref Range Status   01/29/2024 6.70 (H) 4.80 - 5.60 % Final   11/20/2023 6.70 (H) 4.80 - 5.60 % Final   Stable.  Encourage continued lifestyle modifications.    5. Essential hypertension  BP Readings from Last 3 Encounters:   01/30/24 136/80   01/19/24 138/84   12/19/23 148/94   Blood pressure slightly improved.  Continue lifestyle modifications.  Will continue to monitor and follow-up in about 2 months      Return in about 9 weeks (around 4/2/2024) for Recheck.    Future Appointments         Provider Department Center    2/1/2024 12:30 PM [facility] " [facility], PFT LAB 2 [facility] CR[facility] MEDICINE [facility]    2/1/2024 1:00 PM Raad Erazo MD [facility] PULMONARY & CRITICAL CARE MEDICINE [facility]    2/20/2024 11:30 AM Romel Lanza RD [facility] NUTRIT [facility]VC [facility]    4/23/2024 2:30 PM Cj Bermudez MD [facility] INTERNAL MEDICINE [facility]              Cj Bermudez MD  Family Medicine  01/30/2024

## 2024-01-31 LAB — ACE SERPL-CCNC: 17 U/L (ref 14–82)

## 2024-02-02 ENCOUNTER — TELEPHONE (OUTPATIENT)
Dept: INTERNAL MEDICINE | Facility: CLINIC | Age: 59
End: 2024-02-02
Payer: COMMERCIAL

## 2024-02-02 DIAGNOSIS — I10 ESSENTIAL HYPERTENSION: Primary | Chronic | ICD-10-CM

## 2024-02-02 RX ORDER — LOSARTAN POTASSIUM 25 MG/1
25 TABLET ORAL DAILY
Qty: 30 TABLET | Refills: 2 | Status: SHIPPED | OUTPATIENT
Start: 2024-02-02

## 2024-02-02 NOTE — TELEPHONE ENCOUNTER
Caller: Elan Freed    Relationship: Self    Best call back number: [phone]    What is the best time to reach you: AFTER 2:00 PM    Who are you requesting to speak with (clinical staff, provider,  specific staff member): CLINICAL STAFF    What was the call regarding: PATIENT IS ASKING TO HAVE HIS PROVIDER CALL HIM THIS AFTERNOON. HE IS WANTING TO DISCUSS THE BLOOD PRESSURE MEDICATION THAT HE IS TAKING. HE BELIEVES HE MIGHT BE HAVING A REACTION TO IT.     Is it okay if the provider responds through MyChart: NO

## 2024-02-02 NOTE — TELEPHONE ENCOUNTER
Patient has noticed some dry cough since he started lisinopril.  He also feels some tingling of his hands and feet.  When he stopped the medication his symptoms resolved.  He has not taken lisinopril for 2 days.  His blood pressure yesterday was 120/90, this morning it was 150/90.    Suspect side effect of lisinopril.  Recommend discontinuing the medication.  Will try losartan instead.    1. Essential hypertension  BP Readings from Last 3 Encounters:   01/30/24 136/80   01/19/24 138/84   12/19/23 148/94   - losartan (Cozaar) 25 MG tablet; Take 1 tablet by mouth Daily. Stop lisinopril  Dispense: 30 tablet; Refill: 2    Cj Bermudez MD

## 2024-02-06 ENCOUNTER — TELEPHONE (OUTPATIENT)
Dept: INTERNAL MEDICINE | Facility: CLINIC | Age: 59
End: 2024-02-06
Payer: COMMERCIAL

## 2024-02-06 DIAGNOSIS — I10 ESSENTIAL HYPERTENSION: Chronic | ICD-10-CM

## 2024-02-06 RX ORDER — NIFEDIPINE 30 MG/1
30 TABLET, EXTENDED RELEASE ORAL DAILY
Qty: 30 TABLET | Refills: 1 | Status: SHIPPED | OUTPATIENT
Start: 2024-02-06

## 2024-02-06 RX ORDER — LOSARTAN POTASSIUM 50 MG/1
50 TABLET ORAL DAILY
Qty: 30 TABLET | Refills: 1 | Status: SHIPPED | OUTPATIENT
Start: 2024-02-06

## 2024-02-06 NOTE — TELEPHONE ENCOUNTER
No problem.  I have sent the new prescription for nifedipine 30 mg and losartan 50 mg.  He should keep an eye on his blood pressure over the next couple of weeks.  If it starts to go up we could increase the losartan further.

## 2024-02-06 NOTE — TELEPHONE ENCOUNTER
PATIENT CALLED ABOUT HIS BP MEDS HIS ON WHICH IS LOSARTAN AND NIFEDIPINE XL. HE SAID HIS ANKLES ARE SWOLLEN AND HE DOESN'T KNOW FOR SURE IF ITS COMING FROM THE MEDS OR NOT. HE SAID HIS BP HAS BEEN 141/89 BEFORE TAKING MEDS AND AROUND 130/89 /89 AFTER TAKING HIS BP MEDS.   HE NEED SOMEONE TO CALL HIM BACK.

## 2024-02-06 NOTE — TELEPHONE ENCOUNTER
Nifedipine can cause swelling of the ankles, not so often at the 60 mg dose, usually with higher doses.  However some patients do get swelling with the 60 mg dose, so it is an option to reduce to 30 mg dose.  With that we would need to increase the losartan to a higher dose, at least 50 mg.

## 2024-02-13 ENCOUNTER — TELEPHONE (OUTPATIENT)
Dept: INTERNAL MEDICINE | Facility: CLINIC | Age: 59
End: 2024-02-13
Payer: COMMERCIAL

## 2024-02-14 ENCOUNTER — OFFICE VISIT (OUTPATIENT)
Dept: INTERNAL MEDICINE | Facility: CLINIC | Age: 59
End: 2024-02-14
Payer: COMMERCIAL

## 2024-02-14 VITALS
HEIGHT: 70 IN | DIASTOLIC BLOOD PRESSURE: 80 MMHG | TEMPERATURE: 97.3 F | WEIGHT: 223 LBS | BODY MASS INDEX: 31.92 KG/M2 | SYSTOLIC BLOOD PRESSURE: 130 MMHG | HEART RATE: 76 BPM

## 2024-02-14 DIAGNOSIS — E11.9 TYPE 2 DIABETES MELLITUS WITHOUT COMPLICATION, WITHOUT LONG-TERM CURRENT USE OF INSULIN: Chronic | ICD-10-CM

## 2024-02-14 DIAGNOSIS — F41.1 GAD (GENERALIZED ANXIETY DISORDER): Chronic | ICD-10-CM

## 2024-02-14 DIAGNOSIS — I10 ESSENTIAL HYPERTENSION: Chronic | ICD-10-CM

## 2024-02-14 DIAGNOSIS — R20.0 NUMBNESS AND TINGLING OF BOTH FEET: Primary | ICD-10-CM

## 2024-02-14 DIAGNOSIS — R20.2 NUMBNESS AND TINGLING OF BOTH FEET: Primary | ICD-10-CM

## 2024-02-14 RX ORDER — LOSARTAN POTASSIUM 100 MG/1
100 TABLET ORAL DAILY
Qty: 30 TABLET | Refills: 3 | Status: SHIPPED | OUTPATIENT
Start: 2024-02-14

## 2024-02-16 ENCOUNTER — CONSULT (OUTPATIENT)
Dept: ONCOLOGY | Facility: CLINIC | Age: 59
End: 2024-02-16
Payer: COMMERCIAL

## 2024-02-16 ENCOUNTER — LAB (OUTPATIENT)
Dept: LAB | Facility: HOSPITAL | Age: 59
End: 2024-02-16
Payer: COMMERCIAL

## 2024-02-16 VITALS
SYSTOLIC BLOOD PRESSURE: 150 MMHG | DIASTOLIC BLOOD PRESSURE: 83 MMHG | BODY MASS INDEX: 32.21 KG/M2 | RESPIRATION RATE: 18 BRPM | HEIGHT: 70 IN | OXYGEN SATURATION: 96 % | TEMPERATURE: 97.5 F | WEIGHT: 225 LBS | HEART RATE: 75 BPM

## 2024-02-16 DIAGNOSIS — D72.820 LYMPHOCYTOSIS: Primary | Chronic | ICD-10-CM

## 2024-02-16 DIAGNOSIS — D72.820 LYMPHOCYTOSIS: Primary | ICD-10-CM

## 2024-02-16 PROBLEM — M19.011 PRIMARY OSTEOARTHRITIS OF BOTH SHOULDERS: Status: ACTIVE | Noted: 2023-11-22

## 2024-02-16 PROBLEM — I10 ESSENTIAL HYPERTENSION: Status: ACTIVE | Noted: 2022-03-18

## 2024-02-16 PROBLEM — M19.012 PRIMARY OSTEOARTHRITIS OF BOTH SHOULDERS: Status: ACTIVE | Noted: 2023-11-22

## 2024-02-16 PROBLEM — E78.2 MIXED HYPERLIPIDEMIA: Status: ACTIVE | Noted: 2023-11-21

## 2024-02-16 PROBLEM — K21.9 GASTROESOPHAGEAL REFLUX DISEASE WITHOUT ESOPHAGITIS: Status: ACTIVE | Noted: 2023-11-20

## 2024-02-16 PROBLEM — E11.9 TYPE 2 DIABETES MELLITUS WITHOUT COMPLICATION, WITHOUT LONG-TERM CURRENT USE OF INSULIN: Status: ACTIVE | Noted: 2023-11-21

## 2024-02-16 LAB
ALBUMIN SERPL-MCNC: 4.6 G/DL (ref 3.5–5.2)
ALBUMIN/GLOB SERPL: 1.8 G/DL
ALP SERPL-CCNC: 56 U/L (ref 39–117)
ALT SERPL W P-5'-P-CCNC: 43 U/L (ref 1–41)
ANION GAP SERPL CALCULATED.3IONS-SCNC: 13 MMOL/L (ref 5–15)
AST SERPL-CCNC: 21 U/L (ref 1–40)
BASOPHILS # BLD AUTO: 0.06 10*3/MM3 (ref 0–0.2)
BASOPHILS NFR BLD AUTO: 0.3 % (ref 0–1.5)
BILIRUB SERPL-MCNC: 0.3 MG/DL (ref 0–1.2)
BUN SERPL-MCNC: 24 MG/DL (ref 6–20)
BUN/CREAT SERPL: 23.8 (ref 7–25)
CALCIUM SPEC-SCNC: 9.3 MG/DL (ref 8.6–10.5)
CHLORIDE SERPL-SCNC: 104 MMOL/L (ref 98–107)
CO2 SERPL-SCNC: 24 MMOL/L (ref 22–29)
CREAT SERPL-MCNC: 1.01 MG/DL (ref 0.76–1.27)
DEPRECATED RDW RBC AUTO: 42.3 FL (ref 37–54)
EGFRCR SERPLBLD CKD-EPI 2021: 86.7 ML/MIN/1.73
EOSINOPHIL # BLD AUTO: 0.28 10*3/MM3 (ref 0–0.4)
EOSINOPHIL NFR BLD AUTO: 1.2 % (ref 0.3–6.2)
ERYTHROCYTE [DISTWIDTH] IN BLOOD BY AUTOMATED COUNT: 13 % (ref 12.3–15.4)
GLOBULIN UR ELPH-MCNC: 2.6 GM/DL
GLUCOSE SERPL-MCNC: 91 MG/DL (ref 65–99)
HCT VFR BLD AUTO: 44.2 % (ref 37.5–51)
HGB BLD-MCNC: 15 G/DL (ref 13–17.7)
IMM GRANULOCYTES # BLD AUTO: 0.02 10*3/MM3 (ref 0–0.05)
IMM GRANULOCYTES NFR BLD AUTO: 0.1 % (ref 0–0.5)
LYMPHOCYTES # BLD AUTO: 17.22 10*3/MM3 (ref 0.7–3.1)
LYMPHOCYTES NFR BLD AUTO: 72.1 % (ref 19.6–45.3)
MCH RBC QN AUTO: 30.1 PG (ref 26.6–33)
MCHC RBC AUTO-ENTMCNC: 33.9 G/DL (ref 31.5–35.7)
MCV RBC AUTO: 88.8 FL (ref 79–97)
MONOCYTES # BLD AUTO: 0.69 10*3/MM3 (ref 0.1–0.9)
MONOCYTES NFR BLD AUTO: 2.9 % (ref 5–12)
NEUTROPHILS NFR BLD AUTO: 23.4 % (ref 42.7–76)
NEUTROPHILS NFR BLD AUTO: 5.61 10*3/MM3 (ref 1.7–7)
PLATELET # BLD AUTO: 196 10*3/MM3 (ref 140–450)
PMV BLD AUTO: 9.8 FL (ref 6–12)
POTASSIUM SERPL-SCNC: 4.4 MMOL/L (ref 3.5–5.2)
PROT SERPL-MCNC: 7.2 G/DL (ref 6–8.5)
RBC # BLD AUTO: 4.98 10*6/MM3 (ref 4.14–5.8)
SODIUM SERPL-SCNC: 141 MMOL/L (ref 136–145)
WBC NRBC COR # BLD AUTO: 23.88 10*3/MM3 (ref 3.4–10.8)

## 2024-02-16 PROCEDURE — 80053 COMPREHEN METABOLIC PANEL: CPT

## 2024-02-16 PROCEDURE — 85025 COMPLETE CBC W/AUTO DIFF WBC: CPT

## 2024-02-16 PROCEDURE — 36415 COLL VENOUS BLD VENIPUNCTURE: CPT

## 2024-02-19 LAB — ZAP-70 RESULT: NORMAL

## 2024-02-20 ENCOUNTER — HOSPITAL ENCOUNTER (OUTPATIENT)
Dept: NUTRITION | Facility: HOSPITAL | Age: 59
Setting detail: RECURRING SERIES
Discharge: HOME | End: 2024-02-20

## 2024-02-20 LAB — Lab: NORMAL

## 2024-02-20 PROCEDURE — 97803 MED NUTRITION INDIV SUBSEQ: CPT

## 2024-02-20 NOTE — PROGRESS NOTES
Adult Outpatient Nutrition  Assessment/PES    Patient Name:  Elan Freed  YOB: 1965  MRN: [id number]    Assessment Date:  2/20/2024    Comments:  Patient was seen today for a continued follow up visit referred for weight loss. Patient stated that he continues to lose weight and is feeling better. He states that he is down a total of 17 pounds. He states that he is exercising more often and has cut out sugar sweetened beverages entirely. He states that he only drinks water and un sweet tea. He stated he would like some more ideas for meals. RD will send him some ideas for meals. He stated that he would reach out if he was interested in scheduling another continued follow up visit.                             Electronically signed by:  Romel Lanza RD  02/20/24 11:44 EST

## 2024-02-21 LAB
CLL TARGETGENE PANEL RESULT: NORMAL
IGVH RESULT: NORMAL
REF LAB TEST METHOD: NORMAL
REF LAB TEST RESULTS: NORMAL

## 2024-02-27 LAB — P53 MUTATION RESULT: NORMAL

## 2024-03-02 LAB — Lab: NORMAL

## 2024-03-15 ENCOUNTER — OFFICE VISIT (OUTPATIENT)
Dept: ONCOLOGY | Facility: CLINIC | Age: 59
End: 2024-03-15
Payer: COMMERCIAL

## 2024-03-15 VITALS
HEART RATE: 72 BPM | RESPIRATION RATE: 18 BRPM | WEIGHT: 223 LBS | OXYGEN SATURATION: 95 % | DIASTOLIC BLOOD PRESSURE: 92 MMHG | BODY MASS INDEX: 31.92 KG/M2 | TEMPERATURE: 98.3 F | SYSTOLIC BLOOD PRESSURE: 149 MMHG | HEIGHT: 70 IN

## 2024-03-15 DIAGNOSIS — D72.820 LYMPHOCYTOSIS: Primary | Chronic | ICD-10-CM

## 2024-03-15 DIAGNOSIS — C91.10 CLL (CHRONIC LYMPHOCYTIC LEUKEMIA): ICD-10-CM

## 2024-03-15 PROCEDURE — 99214 OFFICE O/P EST MOD 30 MIN: CPT | Performed by: INTERNAL MEDICINE

## 2024-03-15 RX ORDER — LOSARTAN POTASSIUM 50 MG/1
50 TABLET ORAL DAILY
COMMUNITY
Start: 2024-03-14

## 2024-03-15 NOTE — LETTER
March 15, 2024       No Recipients    Patient: Elan Freed   YOB: 1965   Date of Visit: 3/15/2024     Dear Cj Bermudez MD:       Thank you for referring Elan Freed to me for evaluation. Below are the relevant portions of my assessment and plan of care.    If you have questions, please do not hesitate to call me. I look forward to following Elan along with you.         Sincerely,        Son Mcdonough MD        CC:   No Recipients    Son Mcdonough MD  03/15/24 1358  Sign when Signing Visit  CHIEF COMPLAINT: Asymptomatic CLL    Problem List:  Oncology/Hematology History Overview Note   1. CLL stage 0  2.  Reflux  3.  Hypertension  4.  Hyperlipidemia  5.  Type 2 diabetes  6.  Elevated ALT with fatty liver on CT  7.  Colon polyps  8.  Granulomatous calcifications in the spleen on CT    Hematology history timeline:  -2/19/2016 CT angiogram chest [facility] states lymph nodes were normal.  Multiple small pulmonary nodules mostly in the upper lung zone 4-5 mm with recommended follow-up in 6-9 months CT abdomen pelvis showed no adenopathy or splenomegaly.  Spleen was described as normal  -3/16/2022 white count 13,650 hemoglobin 14.7 platelets 209,000.  Absolute lymphocyte count 9960.  CT angiogram chest shows mild bilateral axillary adenopathy right axilla 1.8 x 1.3 cm node, left axilla 2.6 x 2 cm node.  No pulmonary embolus.  Doppler venous imaging negative.  -1/22/2024 CT chest low-dose cancer screening compared to 3/16/2022 shows medial left apical lung scarring.  No mediastinal mass.  Right epicardial lymph node increased from 5 mm now 10 mm.  A couple of right lower epicardial nodes largest previously 12 mm now 14 mm.  Smaller more rounded nodule previously 8 mm now 11 mm.  Axillary adenopathy gradually enlarged.  Right sided node 14 x 18 mm previously now 17 x 21 mm.  Diminished or absent hilar fat suggesting reactive or infiltrative nodes.  Diffuse fatty liver change.  Some shotty  retroperitoneal nodes may be present.  Bones normal.  Lung RADS 1 for lung cancer but could have low-grade lymphoproliferative adenopathy.  -1/29/2024 white count 24,500 hemoglobin 15.9 platelets 205,000.  Absolute lymphocyte count 17,930.  Sedimentation rate 7.  LDH normal 197.  Glucose 117 BUN 25 bicarb 21.7 ALT 42 otherwise unremarkable CMP.  ACE level normal 17.    -2/16/2024 [facility] hematology consult: Patient has asymptomatic slowly enlarging adenopathy on screening CT of the chest for lung cancer lung RADS 1.  With this and has rising lymphocyte count as documented above.  No anemia or thrombocytopenia.  No effusions.  No splenomegaly.  No frequent infections or bed drenching night sweats or unexplained fevers or chills.  May well be CLL but we will get his integrated oncology testing performed and I will see him back but even if we confirm this I will not treat him unless he reaches the above criteria.  I will see him back in a few weeks to go over all this.    -2/16/2024 White count 23,880 with hemoglobin 15 platelets 196,000 with absolute lymphocyte count 17,220.  He met a pathology review shows lymphocytosis and 72% of white blood cells with abnormal CD5 clone consistent with CLL.  Hemoglobin 15 platelets 198,000.  Flow cytometry shows abnormal CD5 B-cell population 50% of leukocytes consistent with chronic lymphocytic leukemia/small lymphocytic lymphoma immunophenotype.  CLL FISH panel showed deletion 13 q. but no other genetic aberrancy.  Immunoglobulin variable heavy chain sequencing did not yield interpretable results  ZAP70 positive/CD38 negative/CD49 negative.  P53 negative DNA sequencing.  Clonal immunoglobulin heavy chain and kappa light chain gene rearrangement detected as well as clonal T-cell receptor beta gene rearrangement detected.  No BCL1 or Bcl-2 gene rearrangement.  SNP MicroArray testing showed CLL related clone detected with 2.55 MB of interstitial deletion of 13 q. 14.2 with  chromosomal 13 q. loss of heterozygosity and gain of chromosome 21.  Patients with 13 q. deletion is still normally have longer survival compared to other prognostic groups.  By allelic loss which he has does not confer any additional prognostic information and is the same as mono allelic loss.    -3/15/2024 [facility] hematology consult: The above data indicates he has stage 0 CLL with fairly good prognostic features.  For now there is no indication for treatment such as…  Hemoglobin less than 10 not hemolytic  Platelets less than 100,000 not ITP  10 cm or greater nodes or symptomatic nodes  Splenomegaly  Unexplained B symptoms  Frequent infections  Symptomatic effusions    For now we will go with watchful waiting and he will see my nurse practitioner every 3 months for the next year to see with the jerrica of this is.  If he has absolute lymphocyte count doubling time less than 6 months that can be a soft indication for institution of therapy.     Lymphocytosis       HISTORY OF PRESENT ILLNESS:  The patient is a 59 y.o. male, here for follow up on management of stage 0 CLL    Past Medical History:   Diagnosis Date   • Anxiety    • Arthritis    • Essential hypertension 03/18/2022   • JACK (generalized anxiety disorder) 03/18/2022   • Gastroesophageal reflux disease without esophagitis 11/20/2023   • History of COVID-19 03/18/2022   • Mixed hyperlipidemia 11/21/2023   • Primary osteoarthritis of both shoulders 11/22/2023   • Type 2 diabetes mellitus without complication, without long-term current use of insulin 11/21/2023 11/2023 A1c 6.7     History reviewed. No pertinent surgical history.    No Known Allergies    Family History and Social History reviewed and changed as necessary    REVIEW OF SYSTEM:   No new somatic complaints    PHYSICAL EXAM:  No palpable adenopathy.  No hepatosplenomegaly.    Vitals:    03/15/24 1352   BP: 149/92   Pulse: 72   Resp: 18   Temp: 98.3 °F (36.8 °C)   SpO2: 95%   Weight: 101 kg (223  "lb)   Height: 177.8 cm (70\")     Vitals:    03/15/24 1352   PainSc: 0-No pain     ECOG score: 0     Vitals reviewed.    ECOG: (0) Fully Active - Able to Carry On All Pre-disease Performance Without Restriction    Lab Results   Component Value Date    HGB 15.0 02/16/2024    HCT 44.2 02/16/2024    MCV 88.8 02/16/2024     02/16/2024    WBC 23.88 (H) 02/16/2024    NEUTROABS 5.61 02/16/2024    LYMPHSABS 17.22 (H) 02/16/2024    MONOSABS 0.69 02/16/2024    EOSABS 0.28 02/16/2024    BASOSABS 0.06 02/16/2024       Lab Results   Component Value Date    GLUCOSE 91 02/16/2024    BUN 24 (H) 02/16/2024    CREATININE 1.01 02/16/2024     02/16/2024    K 4.4 02/16/2024     02/16/2024    CO2 24.0 02/16/2024    CALCIUM 9.3 02/16/2024    PROTEINTOT 7.2 02/16/2024    ALBUMIN 4.6 02/16/2024    BILITOT 0.3 02/16/2024    ALKPHOS 56 02/16/2024    AST 21 02/16/2024    ALT 43 (H) 02/16/2024             ASSESSMENT & PLAN:  1. CLL stage 0  2.  Reflux  3.  Hypertension  4.  Hyperlipidemia  5.  Type 2 diabetes  6.  Elevated ALT with fatty liver on CT  7.  Colon polyps  8.  Granulomatous calcifications in the spleen on CT    Hematology history timeline:  -2/19/2016 CT angiogram chest [facility] states lymph nodes were normal.  Multiple small pulmonary nodules mostly in the upper lung zone 4-5 mm with recommended follow-up in 6-9 months CT abdomen pelvis showed no adenopathy or splenomegaly.  Spleen was described as normal  -3/16/2022 white count 13,650 hemoglobin 14.7 platelets 209,000.  Absolute lymphocyte count 9960.  CT angiogram chest shows mild bilateral axillary adenopathy right axilla 1.8 x 1.3 cm node, left axilla 2.6 x 2 cm node.  No pulmonary embolus.  Doppler venous imaging negative.  -1/22/2024 CT chest low-dose cancer screening compared to 3/16/2022 shows medial left apical lung scarring.  No mediastinal mass.  Right epicardial lymph node increased from 5 mm now 10 mm.  A couple of right lower epicardial " nodes largest previously 12 mm now 14 mm.  Smaller more rounded nodule previously 8 mm now 11 mm.  Axillary adenopathy gradually enlarged.  Right sided node 14 x 18 mm previously now 17 x 21 mm.  Diminished or absent hilar fat suggesting reactive or infiltrative nodes.  Diffuse fatty liver change.  Some shotty retroperitoneal nodes may be present.  Bones normal.  Lung RADS 1 for lung cancer but could have low-grade lymphoproliferative adenopathy.  -1/29/2024 white count 24,500 hemoglobin 15.9 platelets 205,000.  Absolute lymphocyte count 17,930.  Sedimentation rate 7.  LDH normal 197.  Glucose 117 BUN 25 bicarb 21.7 ALT 42 otherwise unremarkable CMP.  ACE level normal 17.    -2/16/2024 [facility] hematology consult: Patient has asymptomatic slowly enlarging adenopathy on screening CT of the chest for lung cancer lung RADS 1.  With this and has rising lymphocyte count as documented above.  No anemia or thrombocytopenia.  No effusions.  No splenomegaly.  No frequent infections or bed drenching night sweats or unexplained fevers or chills.  May well be CLL but we will get his integrated oncology testing performed and I will see him back but even if we confirm this I will not treat him unless he reaches the above criteria.  I will see him back in a few weeks to go over all this.    -2/16/2024 White count 23,880 with hemoglobin 15 platelets 196,000 with absolute lymphocyte count 17,220.  He met a pathology review shows lymphocytosis and 72% of white blood cells with abnormal CD5 clone consistent with CLL.  Hemoglobin 15 platelets 198,000.  Flow cytometry shows abnormal CD5 B-cell population 50% of leukocytes consistent with chronic lymphocytic leukemia/small lymphocytic lymphoma immunophenotype.  CLL FISH panel showed deletion 13 q. but no other genetic aberrancy.  Immunoglobulin variable heavy chain sequencing did not yield interpretable results  ZAP70 positive/CD38 negative/CD49 negative.  P53 negative DNA  sequencing.  Clonal immunoglobulin heavy chain and kappa light chain gene rearrangement detected as well as clonal T-cell receptor beta gene rearrangement detected.  No BCL1 or Bcl-2 gene rearrangement.  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 loss of heterozygosity and gain of chromosome 21.  Patients with 13 q. deletion is still normally have longer survival compared to other prognostic groups.  By allelic loss which he has does not confer any additional prognostic information and is the same as mono allelic loss.    -3/15/2024 [facility] hematology consult: The above data indicates he has stage 0 CLL with fairly good prognostic features.  For now there is no indication for treatment such as…  Hemoglobin less than 10 not hemolytic  Platelets less than 100,000 not ITP  10 cm or greater nodes or symptomatic nodes  Splenomegaly  Unexplained B symptoms  Frequent infections  Symptomatic effusions    For now we will go with watchful waiting and he will see my nurse practitioner every 3 months for the next year to see with the jerrica of this is.  If he has absolute lymphocyte count doubling time less than 6 months that can be a soft indication for institution of therapy.    Total time of care today inclusive of time spent today prior to patient's arrival reviewing interval records and cataloging as outlined above and during visit interviewing patient as to signs or symptoms of his CLL and treatment decision making tree as outlined above and after visit instituting this plan took 30 minutes of patient care time throughout the day today.  Son Mcdonough MD    03/15/2024

## 2024-03-15 NOTE — PROGRESS NOTES
CHIEF COMPLAINT: Asymptomatic CLL    Problem List:  Oncology/Hematology History Overview Note   1. CLL stage 0  2.  Reflux  3.  Hypertension  4.  Hyperlipidemia  5.  Type 2 diabetes  6.  Elevated ALT with fatty liver on CT  7.  Colon polyps  8.  Granulomatous calcifications in the spleen on CT    Hematology history timeline:  -2/19/2016 CT angiogram chest [facility] states lymph nodes were normal.  Multiple small pulmonary nodules mostly in the upper lung zone 4-5 mm with recommended follow-up in 6-9 months CT abdomen pelvis showed no adenopathy or splenomegaly.  Spleen was described as normal  -3/16/2022 white count 13,650 hemoglobin 14.7 platelets 209,000.  Absolute lymphocyte count 9960.  CT angiogram chest shows mild bilateral axillary adenopathy right axilla 1.8 x 1.3 cm node, left axilla 2.6 x 2 cm node.  No pulmonary embolus.  Doppler venous imaging negative.  -1/22/2024 CT chest low-dose cancer screening compared to 3/16/2022 shows medial left apical lung scarring.  No mediastinal mass.  Right epicardial lymph node increased from 5 mm now 10 mm.  A couple of right lower epicardial nodes largest previously 12 mm now 14 mm.  Smaller more rounded nodule previously 8 mm now 11 mm.  Axillary adenopathy gradually enlarged.  Right sided node 14 x 18 mm previously now 17 x 21 mm.  Diminished or absent hilar fat suggesting reactive or infiltrative nodes.  Diffuse fatty liver change.  Some shotty retroperitoneal nodes may be present.  Bones normal.  Lung RADS 1 for lung cancer but could have low-grade lymphoproliferative adenopathy.  -1/29/2024 white count 24,500 hemoglobin 15.9 platelets 205,000.  Absolute lymphocyte count 17,930.  Sedimentation rate 7.  LDH normal 197.  Glucose 117 BUN 25 bicarb 21.7 ALT 42 otherwise unremarkable CMP.  ACE level normal 17.    -2/16/2024 [facility] hematology consult: Patient has asymptomatic slowly enlarging adenopathy on screening CT of the chest for lung cancer lung RADS  1.  With this and has rising lymphocyte count as documented above.  No anemia or thrombocytopenia.  No effusions.  No splenomegaly.  No frequent infections or bed drenching night sweats or unexplained fevers or chills.  May well be CLL but we will get his integrated oncology testing performed and I will see him back but even if we confirm this I will not treat him unless he reaches the above criteria.  I will see him back in a few weeks to go over all this.    -2/16/2024 White count 23,880 with hemoglobin 15 platelets 196,000 with absolute lymphocyte count 17,220.  He met a pathology review shows lymphocytosis and 72% of white blood cells with abnormal CD5 clone consistent with CLL.  Hemoglobin 15 platelets 198,000.  Flow cytometry shows abnormal CD5 B-cell population 50% of leukocytes consistent with chronic lymphocytic leukemia/small lymphocytic lymphoma immunophenotype.  CLL FISH panel showed deletion 13 q. but no other genetic aberrancy.  Immunoglobulin variable heavy chain sequencing did not yield interpretable results  ZAP70 positive/CD38 negative/CD49 negative.  P53 negative DNA sequencing.  Clonal immunoglobulin heavy chain and kappa light chain gene rearrangement detected as well as clonal T-cell receptor beta gene rearrangement detected.  No BCL1 or Bcl-2 gene rearrangement.  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 loss of heterozygosity and gain of chromosome 21.  Patients with 13 q. deletion is still normally have longer survival compared to other prognostic groups.  By allelic loss which he has does not confer any additional prognostic information and is the same as mono allelic loss.    -3/15/2024 [facility] hematology consult: The above data indicates he has stage 0 CLL with fairly good prognostic features.  For now there is no indication for treatment such as…  Hemoglobin less than 10 not hemolytic  Platelets  "less than 100,000 not ITP  10 cm or greater nodes or symptomatic nodes  Splenomegaly  Unexplained B symptoms  Frequent infections  Symptomatic effusions    For now we will go with watchful waiting and he will see my nurse practitioner every 3 months for the next year to see with the jerrica of this is.  If he has absolute lymphocyte count doubling time less than 6 months that can be a soft indication for institution of therapy.     Lymphocytosis       HISTORY OF PRESENT ILLNESS:  The patient is a 59 y.o. male, here for follow up on management of stage 0 CLL    Past Medical History:   Diagnosis Date    Anxiety     Arthritis     Essential hypertension 03/18/2022    JACK (generalized anxiety disorder) 03/18/2022    Gastroesophageal reflux disease without esophagitis 11/20/2023    History of COVID-19 03/18/2022    Mixed hyperlipidemia 11/21/2023    Primary osteoarthritis of both shoulders 11/22/2023    Type 2 diabetes mellitus without complication, without long-term current use of insulin 11/21/2023 11/2023 A1c 6.7     History reviewed. No pertinent surgical history.    No Known Allergies    Family History and Social History reviewed and changed as necessary    REVIEW OF SYSTEM:   No new somatic complaints    PHYSICAL EXAM:  No palpable adenopathy.  No hepatosplenomegaly.    Vitals:    03/15/24 1352   BP: 149/92   Pulse: 72   Resp: 18   Temp: 98.3 °F (36.8 °C)   SpO2: 95%   Weight: 101 kg (223 lb)   Height: 177.8 cm (70\")     Vitals:    03/15/24 1352   PainSc: 0-No pain     ECOG score: 0     Vitals reviewed.    ECOG: (0) Fully Active - Able to Carry On All Pre-disease Performance Without Restriction    Lab Results   Component Value Date    HGB 15.0 02/16/2024    HCT 44.2 02/16/2024    MCV 88.8 02/16/2024     02/16/2024    WBC 23.88 (H) 02/16/2024    NEUTROABS 5.61 02/16/2024    LYMPHSABS 17.22 (H) 02/16/2024    MONOSABS 0.69 02/16/2024    EOSABS 0.28 02/16/2024    BASOSABS 0.06 02/16/2024       Lab Results "   Component Value Date    GLUCOSE 91 02/16/2024    BUN 24 (H) 02/16/2024    CREATININE 1.01 02/16/2024     02/16/2024    K 4.4 02/16/2024     02/16/2024    CO2 24.0 02/16/2024    CALCIUM 9.3 02/16/2024    PROTEINTOT 7.2 02/16/2024    ALBUMIN 4.6 02/16/2024    BILITOT 0.3 02/16/2024    ALKPHOS 56 02/16/2024    AST 21 02/16/2024    ALT 43 (H) 02/16/2024             ASSESSMENT & PLAN:  1. CLL stage 0  2.  Reflux  3.  Hypertension  4.  Hyperlipidemia  5.  Type 2 diabetes  6.  Elevated ALT with fatty liver on CT  7.  Colon polyps  8.  Granulomatous calcifications in the spleen on CT    Hematology history timeline:  -2/19/2016 CT angiogram chest [facility] states lymph nodes were normal.  Multiple small pulmonary nodules mostly in the upper lung zone 4-5 mm with recommended follow-up in 6-9 months CT abdomen pelvis showed no adenopathy or splenomegaly.  Spleen was described as normal  -3/16/2022 white count 13,650 hemoglobin 14.7 platelets 209,000.  Absolute lymphocyte count 9960.  CT angiogram chest shows mild bilateral axillary adenopathy right axilla 1.8 x 1.3 cm node, left axilla 2.6 x 2 cm node.  No pulmonary embolus.  Doppler venous imaging negative.  -1/22/2024 CT chest low-dose cancer screening compared to 3/16/2022 shows medial left apical lung scarring.  No mediastinal mass.  Right epicardial lymph node increased from 5 mm now 10 mm.  A couple of right lower epicardial nodes largest previously 12 mm now 14 mm.  Smaller more rounded nodule previously 8 mm now 11 mm.  Axillary adenopathy gradually enlarged.  Right sided node 14 x 18 mm previously now 17 x 21 mm.  Diminished or absent hilar fat suggesting reactive or infiltrative nodes.  Diffuse fatty liver change.  Some shotty retroperitoneal nodes may be present.  Bones normal.  Lung RADS 1 for lung cancer but could have low-grade lymphoproliferative adenopathy.  -1/29/2024 white count 24,500 hemoglobin 15.9 platelets 205,000.  Absolute  lymphocyte count 17,930.  Sedimentation rate 7.  LDH normal 197.  Glucose 117 BUN 25 bicarb 21.7 ALT 42 otherwise unremarkable CMP.  ACE level normal 17.    -2/16/2024 [facility] hematology consult: Patient has asymptomatic slowly enlarging adenopathy on screening CT of the chest for lung cancer lung RADS 1.  With this and has rising lymphocyte count as documented above.  No anemia or thrombocytopenia.  No effusions.  No splenomegaly.  No frequent infections or bed drenching night sweats or unexplained fevers or chills.  May well be CLL but we will get his integrated oncology testing performed and I will see him back but even if we confirm this I will not treat him unless he reaches the above criteria.  I will see him back in a few weeks to go over all this.    -2/16/2024 White count 23,880 with hemoglobin 15 platelets 196,000 with absolute lymphocyte count 17,220.  He met a pathology review shows lymphocytosis and 72% of white blood cells with abnormal CD5 clone consistent with CLL.  Hemoglobin 15 platelets 198,000.  Flow cytometry shows abnormal CD5 B-cell population 50% of leukocytes consistent with chronic lymphocytic leukemia/small lymphocytic lymphoma immunophenotype.  CLL FISH panel showed deletion 13 q. but no other genetic aberrancy.  Immunoglobulin variable heavy chain sequencing did not yield interpretable results  ZAP70 positive/CD38 negative/CD49 negative.  P53 negative DNA sequencing.  Clonal immunoglobulin heavy chain and kappa light chain gene rearrangement detected as well as clonal T-cell receptor beta gene rearrangement detected.  No BCL1 or Bcl-2 gene rearrangement.  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 loss of heterozygosity and gain of chromosome 21.  Patients with 13 q. deletion is still normally have longer survival compared to other prognostic groups.  By allelic loss which he has does not confer any  additional prognostic information and is the same as mono allelic loss.    -3/15/2024 [facility] hematology consult: The above data indicates he has stage 0 CLL with fairly good prognostic features.  For now there is no indication for treatment such as…  Hemoglobin less than 10 not hemolytic  Platelets less than 100,000 not ITP  10 cm or greater nodes or symptomatic nodes  Splenomegaly  Unexplained B symptoms  Frequent infections  Symptomatic effusions    For now we will go with watchful waiting and he will see my nurse practitioner every 3 months for the next year to see with the jerrica of this is.  If he has absolute lymphocyte count doubling time less than 6 months that can be a soft indication for institution of therapy.    Total time of care today inclusive of time spent today prior to patient's arrival reviewing interval records and cataloging as outlined above and during visit interviewing patient as to signs or symptoms of his CLL and treatment decision making tree as outlined above and after visit instituting this plan took 30 minutes of patient care time throughout the day today.  Son Mcdonough MD    03/15/2024

## 2024-03-19 DIAGNOSIS — I10 ESSENTIAL HYPERTENSION: Chronic | ICD-10-CM

## 2024-03-27 DIAGNOSIS — E11.9 TYPE 2 DIABETES MELLITUS WITHOUT COMPLICATION, WITHOUT LONG-TERM CURRENT USE OF INSULIN: Chronic | ICD-10-CM

## 2024-03-27 RX ORDER — METFORMIN HYDROCHLORIDE 500 MG/1
500 TABLET, EXTENDED RELEASE ORAL
Qty: 90 TABLET | Refills: 0 | Status: SHIPPED | OUTPATIENT
Start: 2024-03-27

## 2024-04-21 DIAGNOSIS — I10 ESSENTIAL HYPERTENSION: Chronic | ICD-10-CM

## 2024-04-21 DIAGNOSIS — J34.89 NASAL STENOSIS: ICD-10-CM

## 2024-04-21 DIAGNOSIS — F41.1 GAD (GENERALIZED ANXIETY DISORDER): Chronic | ICD-10-CM

## 2024-04-22 RX ORDER — HYDROXYZINE HYDROCHLORIDE 25 MG/1
12.5-5 TABLET, FILM COATED ORAL NIGHTLY PRN
Qty: 90 TABLET | Refills: 1 | Status: SHIPPED | OUTPATIENT
Start: 2024-04-22

## 2024-04-22 RX ORDER — LOSARTAN POTASSIUM 50 MG/1
50 TABLET ORAL DAILY
Qty: 90 TABLET | Refills: 1 | Status: SHIPPED | OUTPATIENT
Start: 2024-04-22

## 2024-04-22 RX ORDER — NIFEDIPINE 30 MG/1
30 TABLET, EXTENDED RELEASE ORAL DAILY
Qty: 90 TABLET | Refills: 1 | Status: SHIPPED | OUTPATIENT
Start: 2024-04-22

## 2024-04-22 RX ORDER — AZELASTINE 1 MG/ML
2 SPRAY, METERED NASAL 2 TIMES DAILY
Qty: 30 ML | Refills: 2 | Status: SHIPPED | OUTPATIENT
Start: 2024-04-22

## 2024-04-23 ENCOUNTER — LAB (OUTPATIENT)
Dept: LAB | Facility: HOSPITAL | Age: 59
End: 2024-04-23
Payer: COMMERCIAL

## 2024-04-23 ENCOUNTER — PRIOR AUTHORIZATION (OUTPATIENT)
Dept: INTERNAL MEDICINE | Facility: CLINIC | Age: 59
End: 2024-04-23

## 2024-04-23 ENCOUNTER — OFFICE VISIT (OUTPATIENT)
Dept: INTERNAL MEDICINE | Facility: CLINIC | Age: 59
End: 2024-04-23
Payer: COMMERCIAL

## 2024-04-23 VITALS
SYSTOLIC BLOOD PRESSURE: 118 MMHG | WEIGHT: 221 LBS | TEMPERATURE: 98 F | HEART RATE: 72 BPM | BODY MASS INDEX: 31.64 KG/M2 | HEIGHT: 70 IN | DIASTOLIC BLOOD PRESSURE: 80 MMHG

## 2024-04-23 DIAGNOSIS — R20.2 NUMBNESS AND TINGLING OF BOTH FEET: ICD-10-CM

## 2024-04-23 DIAGNOSIS — E11.9 TYPE 2 DIABETES MELLITUS WITHOUT COMPLICATION, WITHOUT LONG-TERM CURRENT USE OF INSULIN: Primary | ICD-10-CM

## 2024-04-23 DIAGNOSIS — C91.10 CLL (CHRONIC LYMPHOCYTIC LEUKEMIA): Chronic | ICD-10-CM

## 2024-04-23 DIAGNOSIS — R20.0 NUMBNESS AND TINGLING OF BOTH FEET: ICD-10-CM

## 2024-04-23 DIAGNOSIS — I10 ESSENTIAL HYPERTENSION: Chronic | ICD-10-CM

## 2024-04-23 DIAGNOSIS — Z23 NEED FOR PNEUMOCOCCAL 20-VALENT CONJUGATE VACCINATION: ICD-10-CM

## 2024-04-23 LAB
EXPIRATION DATE: ABNORMAL
HBA1C MFR BLD: 6.4 % (ref 4.5–5.7)
Lab: ABNORMAL

## 2024-04-23 PROCEDURE — 36415 COLL VENOUS BLD VENIPUNCTURE: CPT

## 2024-04-23 PROCEDURE — 99214 OFFICE O/P EST MOD 30 MIN: CPT | Performed by: STUDENT IN AN ORGANIZED HEALTH CARE EDUCATION/TRAINING PROGRAM

## 2024-04-23 PROCEDURE — 83921 ORGANIC ACID SINGLE QUANT: CPT

## 2024-04-23 PROCEDURE — 90471 IMMUNIZATION ADMIN: CPT | Performed by: STUDENT IN AN ORGANIZED HEALTH CARE EDUCATION/TRAINING PROGRAM

## 2024-04-23 PROCEDURE — 84443 ASSAY THYROID STIM HORMONE: CPT

## 2024-04-23 PROCEDURE — 83036 HEMOGLOBIN GLYCOSYLATED A1C: CPT | Performed by: STUDENT IN AN ORGANIZED HEALTH CARE EDUCATION/TRAINING PROGRAM

## 2024-04-23 PROCEDURE — 82607 VITAMIN B-12: CPT

## 2024-04-23 PROCEDURE — 90677 PCV20 VACCINE IM: CPT | Performed by: STUDENT IN AN ORGANIZED HEALTH CARE EDUCATION/TRAINING PROGRAM

## 2024-04-23 NOTE — TELEPHONE ENCOUNTER
PA requested for Azelastine HCI 0.1% nasal spray.    KEY : [id number]    Sent to plan :  Response from [facility] My Meds - Available without authorization.    I called [facility] was told they were requesting 90 ml for 75 days     KEY : [id number] I resent it to plan      Sent to plan : waiting on response. -A new prior authorization (PA) request cannot be started at this time because there is a request already open for this drug with CoverMyMeds. Please contact the PA call center if you have questions on the status of this request.

## 2024-04-23 NOTE — PROGRESS NOTES
"Chief Complaint  Elan Freed is a 59 y.o. male presenting for Diabetes.     From [place]. Lives with wife. Has son born 1992. Has own business - building swimming pools since 1987. Twin brother passed unexpectedly May 2022.     Patient has a past medical history of CLL (3/2024, ST 0, f/u dr. Mcdonough), hypertension, T2DM (11/2023), JACK, GERD and uncomplicated COVID-19 (3/16/22).    History of Present Illness  Patient is here for follow-up.    He is still reporting episodes of pins-and-needles/numbness, mostly of his legs and feet, but can also be at other locations including ears, eyelids, upper extremities.  Symptoms are not so much present during the daytime, he does notice more at night.  No predominant side.  I did order check of TSH, vitamin B12 and MMA, but the blood work was never collected.    Patient also had surgery for his nasal polyp on the left side, this went well and blockage has now resolved.    He has establish care with heme-onc Dr. Mcdonough, and has established diagnosis of CLL stage 0.  Currently will follow regularly with heme-onc.    The following portions of the patient's history were reviewed and updated as appropriate: allergies, current medications, past family history, past medical history, past social history, past surgical history, and problem list.    Objective  /80 (BP Location: Left arm, Patient Position: Sitting, Cuff Size: Adult)   Pulse 72   Temp 98 °F (36.7 °C) (Temporal)   Ht 177.8 cm (70\")   Wt 100 kg (221 lb)   BMI 31.71 kg/m²     Physical Exam  Constitutional:       Appearance: Normal appearance.   HENT:      Head: Normocephalic and atraumatic.   Eyes:      Extraocular Movements: Extraocular movements intact.      Conjunctiva/sclera: Conjunctivae normal.   Pulmonary:      Effort: Pulmonary effort is normal. No respiratory distress.   Musculoskeletal:      Cervical back: Normal range of motion and neck supple.   Neurological:      Mental Status: He is alert and " oriented to person, place, and time. Mental status is at baseline.   Psychiatric:         Behavior: Behavior normal.         Thought Content: Thought content normal.         Assessment/Plan   1. Type 2 diabetes mellitus without complication, without long-term current use of insulin  Hemoglobin A1C   Date Value Ref Range Status   04/23/2024 6.4 (A) 4.5 - 5.7 % Final   01/29/2024 6.70 (H) 4.80 - 5.60 % Final   11/20/2023 6.70 (H) 4.80 - 5.60 % Final   Good glycemic control.  Continue on current medication.  - POC Glycosylated Hemoglobin (Hb A1C)    2. CLL (chronic lymphocytic leukemia)  Currently doing well, no B symptoms.  Follow-up with heme-onc in June as planned     3. Numbness and tingling of both feet  Could be related to T2DM, but somewhat atypical with migratory symptoms.  Will check thyroid, B12 with MMA.  If these tests are normal and symptoms continue or get worse, we might consider referral to neurology for evaluation.  - TSH Rfx On Abnormal To Free T4; Future  - Vitamin B12; Future  - Methylmalonic Acid, Serum; Future    4. Essential hypertension  BP Readings from Last 3 Encounters:   04/23/24 118/80   03/15/24 149/92   02/16/24 150/83   Blood pressure currently at goal.  He tells me there was an insurance issue with nifedipine ER 30 mg.  He has been taking, but it is not clear for him what the issue was.  I did send in refill yesterday.  He will check with pharmacy and reach out to me if he has any questions or concerns.    5. Need for pneumococcal 20-valent conjugate vaccination  Administered today  - Pneumococcal Conjugate Vaccine 20-Valent All    Encouraged vaccination with shingles second dose, patient will consider next time.        Return in about 13 weeks (around 7/23/2024), or if symptoms worsen or fail to improve, for Recheck.    Future Appointments         Provider Department Center    6/17/2024 1:00 PM (Arrive by 12:45 PM) Karuna Goodwin APRN [facility] HEMATOLOGY & ONCOLOGY  [facility]    7/23/2024 1:30 PM Cj Bermudez MD [facility] INTERNAL MEDICINE [facility]              Cj Bermudez MD  Family Medicine  04/23/2024

## 2024-04-24 LAB
TSH SERPL DL<=0.05 MIU/L-ACNC: 3.93 UIU/ML (ref 0.27–4.2)
VIT B12 BLD-MCNC: 465 PG/ML (ref 211–946)

## 2024-05-01 LAB — METHYLMALONATE SERPL-SCNC: 223 NMOL/L (ref 0–378)

## 2024-05-05 DIAGNOSIS — E11.9 TYPE 2 DIABETES MELLITUS WITHOUT COMPLICATION, WITHOUT LONG-TERM CURRENT USE OF INSULIN: Chronic | ICD-10-CM

## 2024-05-05 DIAGNOSIS — F41.1 GAD (GENERALIZED ANXIETY DISORDER): Chronic | ICD-10-CM

## 2024-05-06 RX ORDER — HYDROXYZINE HYDROCHLORIDE 25 MG/1
12.5-5 TABLET, FILM COATED ORAL NIGHTLY PRN
Qty: 90 TABLET | Refills: 1 | OUTPATIENT
Start: 2024-05-06

## 2024-05-06 RX ORDER — METFORMIN HYDROCHLORIDE 500 MG/1
500 TABLET, EXTENDED RELEASE ORAL
Qty: 90 TABLET | Refills: 0 | Status: SHIPPED | OUTPATIENT
Start: 2024-05-06

## 2024-06-17 ENCOUNTER — TELEPHONE (OUTPATIENT)
Dept: ONCOLOGY | Facility: CLINIC | Age: 59
End: 2024-06-17

## 2024-06-17 NOTE — TELEPHONE ENCOUNTER
Hub staff attempted to follow warm transfer process and was unsuccessful     Caller: Elan Freed    Relationship to patient: Self    Best call back number: [phone]     Patient is needing: TO RESCHEDULE SAME DAY APPT WITH DELISA FUNK TODAY DUE TO CAR ISSUES

## 2024-06-18 DIAGNOSIS — K21.9 GASTROESOPHAGEAL REFLUX DISEASE WITHOUT ESOPHAGITIS: Chronic | ICD-10-CM

## 2024-06-18 DIAGNOSIS — I10 ESSENTIAL HYPERTENSION: Chronic | ICD-10-CM

## 2024-06-19 RX ORDER — OMEPRAZOLE 20 MG/1
20 CAPSULE, DELAYED RELEASE ORAL DAILY
Qty: 90 CAPSULE | Refills: 1 | Status: SHIPPED | OUTPATIENT
Start: 2024-06-19 | End: 2024-06-19 | Stop reason: SDUPTHER

## 2024-06-19 RX ORDER — NIFEDIPINE 30 MG/1
30 TABLET, EXTENDED RELEASE ORAL DAILY
Qty: 90 TABLET | Refills: 1 | Status: SHIPPED | OUTPATIENT
Start: 2024-06-19

## 2024-06-19 RX ORDER — OMEPRAZOLE 20 MG/1
20 CAPSULE, DELAYED RELEASE ORAL DAILY
Qty: 90 CAPSULE | Refills: 1 | Status: SHIPPED | OUTPATIENT
Start: 2024-06-19

## 2024-06-24 ENCOUNTER — TELEPHONE (OUTPATIENT)
Dept: ONCOLOGY | Facility: CLINIC | Age: 59
End: 2024-06-24

## 2024-06-24 NOTE — TELEPHONE ENCOUNTER
Caller: Elan Freed    Relationship: Self    Best call back number: [phone]     What is the best time to reach you: ASAP    Who are you requesting to speak with (clinical staff, provider,  specific staff member): SCHEDULING    What was the call regarding: PLEASE CALL PT TO R/S HIS FOLLOW UP WITH [facility] HERCULES UNABLE TO SCHEDULE WITHIN TIMEFRAME.

## 2024-06-29 DIAGNOSIS — I10 ESSENTIAL HYPERTENSION: Chronic | ICD-10-CM

## 2024-07-01 RX ORDER — LOSARTAN POTASSIUM 50 MG/1
50 TABLET ORAL DAILY
Qty: 90 TABLET | Refills: 1 | Status: SHIPPED | OUTPATIENT
Start: 2024-07-01

## 2024-07-01 RX ORDER — NIFEDIPINE 30 MG/1
30 TABLET, EXTENDED RELEASE ORAL DAILY
Qty: 90 TABLET | Refills: 1 | Status: SHIPPED | OUTPATIENT
Start: 2024-07-01

## 2024-07-01 NOTE — TELEPHONE ENCOUNTER
Rx Refill Note  Requested Prescriptions     Pending Prescriptions Disp Refills    losartan (COZAAR) 50 MG tablet 90 tablet 1     Sig: Take 1 tablet by mouth Daily.    NIFEdipine XL (PROCARDIA XL) 30 MG 24 hr tablet 90 tablet 1     Sig: Take 1 tablet by mouth Daily.      Last office visit with prescribing clinician: 4/23/2024   Last telemedicine visit with prescribing clinician: Visit date not found   Next office visit with prescribing clinician: 7/23/2024                         Would you like a call back once the refill request has been completed: [] Yes [] No    If the office needs to give you a call back, can they leave a voicemail: [] Yes [] No    Tiff Moore MA  07/01/24, 10:07 EDT

## 2024-07-15 ENCOUNTER — OFFICE VISIT (OUTPATIENT)
Dept: ONCOLOGY | Facility: CLINIC | Age: 59
End: 2024-07-15
Payer: COMMERCIAL

## 2024-07-15 ENCOUNTER — LAB (OUTPATIENT)
Dept: LAB | Facility: HOSPITAL | Age: 59
End: 2024-07-15
Payer: COMMERCIAL

## 2024-07-15 VITALS
OXYGEN SATURATION: 94 % | BODY MASS INDEX: 32.07 KG/M2 | WEIGHT: 224 LBS | DIASTOLIC BLOOD PRESSURE: 74 MMHG | HEIGHT: 70 IN | HEART RATE: 66 BPM | RESPIRATION RATE: 16 BRPM | SYSTOLIC BLOOD PRESSURE: 141 MMHG | TEMPERATURE: 98.3 F

## 2024-07-15 DIAGNOSIS — C91.10 CLL (CHRONIC LYMPHOCYTIC LEUKEMIA): ICD-10-CM

## 2024-07-15 DIAGNOSIS — C91.10 CLL (CHRONIC LYMPHOCYTIC LEUKEMIA): Primary | Chronic | ICD-10-CM

## 2024-07-15 LAB
BASOPHILS # BLD AUTO: 0.06 10*3/MM3 (ref 0–0.2)
BASOPHILS NFR BLD AUTO: 0.2 % (ref 0–1.5)
DEPRECATED RDW RBC AUTO: 46.3 FL (ref 37–54)
EOSINOPHIL # BLD AUTO: 0.27 10*3/MM3 (ref 0–0.4)
EOSINOPHIL NFR BLD AUTO: 0.9 % (ref 0.3–6.2)
ERYTHROCYTE [DISTWIDTH] IN BLOOD BY AUTOMATED COUNT: 13.3 % (ref 12.3–15.4)
HCT VFR BLD AUTO: 42.6 % (ref 37.5–51)
HGB BLD-MCNC: 14.1 G/DL (ref 13–17.7)
IMM GRANULOCYTES # BLD AUTO: 0.03 10*3/MM3 (ref 0–0.05)
IMM GRANULOCYTES NFR BLD AUTO: 0.1 % (ref 0–0.5)
LYMPHOCYTES # BLD AUTO: 26.19 10*3/MM3 (ref 0.7–3.1)
LYMPHOCYTES NFR BLD AUTO: 84.9 % (ref 19.6–45.3)
MCH RBC QN AUTO: 31.1 PG (ref 26.6–33)
MCHC RBC AUTO-ENTMCNC: 33.1 G/DL (ref 31.5–35.7)
MCV RBC AUTO: 93.8 FL (ref 79–97)
MONOCYTES # BLD AUTO: 0.72 10*3/MM3 (ref 0.1–0.9)
MONOCYTES NFR BLD AUTO: 2.3 % (ref 5–12)
NEUTROPHILS NFR BLD AUTO: 11.6 % (ref 42.7–76)
NEUTROPHILS NFR BLD AUTO: 3.58 10*3/MM3 (ref 1.7–7)
PLATELET # BLD AUTO: 190 10*3/MM3 (ref 140–450)
PMV BLD AUTO: 10 FL (ref 6–12)
RBC # BLD AUTO: 4.54 10*6/MM3 (ref 4.14–5.8)
WBC NRBC COR # BLD AUTO: 30.85 10*3/MM3 (ref 3.4–10.8)

## 2024-07-15 PROCEDURE — 36415 COLL VENOUS BLD VENIPUNCTURE: CPT

## 2024-07-15 PROCEDURE — 85025 COMPLETE CBC W/AUTO DIFF WBC: CPT

## 2024-07-15 PROCEDURE — 99213 OFFICE O/P EST LOW 20 MIN: CPT | Performed by: NURSE PRACTITIONER

## 2024-07-15 NOTE — PROGRESS NOTES
CHIEF COMPLAINT: CLL    Problem List:  Oncology/Hematology History Overview Note   1. CLL stage 0  2.  Reflux  3.  Hypertension  4.  Hyperlipidemia  5.  Type 2 diabetes  6.  Elevated ALT with fatty liver on CT  7.  Colon polyps  8.  Granulomatous calcifications in the spleen on CT    Hematology history timeline:  -2/19/2016 CT angiogram chest [facility] states lymph nodes were normal.  Multiple small pulmonary nodules mostly in the upper lung zone 4-5 mm with recommended follow-up in 6-9 months CT abdomen pelvis showed no adenopathy or splenomegaly.  Spleen was described as normal  -3/16/2022 white count 13,650 hemoglobin 14.7 platelets 209,000.  Absolute lymphocyte count 9960.  CT angiogram chest shows mild bilateral axillary adenopathy right axilla 1.8 x 1.3 cm node, left axilla 2.6 x 2 cm node.  No pulmonary embolus.  Doppler venous imaging negative.  -1/22/2024 CT chest low-dose cancer screening compared to 3/16/2022 shows medial left apical lung scarring.  No mediastinal mass.  Right epicardial lymph node increased from 5 mm now 10 mm.  A couple of right lower epicardial nodes largest previously 12 mm now 14 mm.  Smaller more rounded nodule previously 8 mm now 11 mm.  Axillary adenopathy gradually enlarged.  Right sided node 14 x 18 mm previously now 17 x 21 mm.  Diminished or absent hilar fat suggesting reactive or infiltrative nodes.  Diffuse fatty liver change.  Some shotty retroperitoneal nodes may be present.  Bones normal.  Lung RADS 1 for lung cancer but could have low-grade lymphoproliferative adenopathy.  -1/29/2024 white count 24,500 hemoglobin 15.9 platelets 205,000.  Absolute lymphocyte count 17,930.  Sedimentation rate 7.  LDH normal 197.  Glucose 117 BUN 25 bicarb 21.7 ALT 42 otherwise unremarkable CMP.  ACE level normal 17.    -2/16/2024 [facility] hematology consult: Patient has asymptomatic slowly enlarging adenopathy on screening CT of the chest for lung cancer lung RADS 1.  With this  and has rising lymphocyte count as documented above.  No anemia or thrombocytopenia.  No effusions.  No splenomegaly.  No frequent infections or bed drenching night sweats or unexplained fevers or chills.  May well be CLL but we will get his integrated oncology testing performed and I will see him back but even if we confirm this I will not treat him unless he reaches the above criteria.  I will see him back in a few weeks to go over all this.    -2/16/2024 White count 23,880 with hemoglobin 15 platelets 196,000 with absolute lymphocyte count 17,220.  He met a pathology review shows lymphocytosis and 72% of white blood cells with abnormal CD5 clone consistent with CLL.  Hemoglobin 15 platelets 198,000.  Flow cytometry shows abnormal CD5 B-cell population 50% of leukocytes consistent with chronic lymphocytic leukemia/small lymphocytic lymphoma immunophenotype.  CLL FISH panel showed deletion 13 q. but no other genetic aberrancy.  Immunoglobulin variable heavy chain sequencing did not yield interpretable results  ZAP70 positive/CD38 negative/CD49 negative.  P53 negative DNA sequencing.  Clonal immunoglobulin heavy chain and kappa light chain gene rearrangement detected as well as clonal T-cell receptor beta gene rearrangement detected.  No BCL1 or Bcl-2 gene rearrangement.  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 loss of heterozygosity and gain of chromosome 21.  Patients with 13 q. deletion is still normally have longer survival compared to other prognostic groups.  By allelic loss which he has does not confer any additional prognostic information and is the same as mono allelic loss.    -3/15/2024 [facility] hematology consult: The above data indicates he has stage 0 CLL with fairly good prognostic features.  For now there is no indication for treatment such as…  Hemoglobin less than 10 not hemolytic  Platelets less than 100,000  "not ITP  10 cm or greater nodes or symptomatic nodes  Splenomegaly  Unexplained B symptoms  Frequent infections  Symptomatic effusions    For now we will go with watchful waiting and he will see my nurse practitioner every 3 months for the next year to see with the jerrica of this is.  If he has absolute lymphocyte count doubling time less than 6 months that can be a soft indication for institution of therapy.     CLL (chronic lymphocytic leukemia)   3/15/2024 Initial Diagnosis    CLL (chronic lymphocytic leukemia)         HISTORY OF PRESENT ILLNESS:  The patient is a 59 y.o. male, here for follow up on management of CLL, stage 0.  Elan has been doing well since we saw him last with no new concerns.  He has had no illnesses or infections.  No lymphadenopathy that he is aware of.  Appetite is normal, no change in his bowel or bladder habits.    Past Medical History:   Diagnosis Date    Anxiety     Arthritis     Essential hypertension 03/18/2022    JACK (generalized anxiety disorder) 03/18/2022    Gastroesophageal reflux disease without esophagitis 11/20/2023    History of COVID-19 03/18/2022    Mixed hyperlipidemia 11/21/2023    Primary osteoarthritis of both shoulders 11/22/2023    Type 2 diabetes mellitus without complication, without long-term current use of insulin 11/21/2023 11/2023 A1c 6.7     History reviewed. No pertinent surgical history.    No Known Allergies    Family History and Social History reviewed and changed as necessary    REVIEW OF SYSTEM:   No new somatic concerns    PHYSICAL EXAM:  Well-developed, well-nourished healthy appearing male in no distress  No palpable cervical, supraclavicular or axillary adenopathy on exam  Respirations regular and unlabored    Vitals:    07/15/24 0821   BP: 141/74   Pulse: 66   Resp: 16   Temp: 98.3 °F (36.8 °C)   SpO2: 94%   Weight: 102 kg (224 lb)   Height: 177.8 cm (70\")     Vitals:    07/15/24 0821   PainSc: 0-No pain          ECOG score: 0           Vitals " reviewed.    ECOG: (0) Fully Active - Able to Carry On All Pre-disease Performance Without Restriction    Lab Results   Component Value Date    HGB 14.1 07/15/2024    HCT 42.6 07/15/2024    MCV 93.8 07/15/2024     07/15/2024    WBC 30.85 (H) 07/15/2024    NEUTROABS 3.58 07/15/2024    LYMPHSABS 26.19 (H) 07/15/2024    MONOSABS 0.72 07/15/2024    EOSABS 0.27 07/15/2024    BASOSABS 0.06 07/15/2024       Lab Results   Component Value Date    GLUCOSE 91 02/16/2024    BUN 24 (H) 02/16/2024    CREATININE 1.01 02/16/2024     02/16/2024    K 4.4 02/16/2024     02/16/2024    CO2 24.0 02/16/2024    CALCIUM 9.3 02/16/2024    PROTEINTOT 7.2 02/16/2024    ALBUMIN 4.6 02/16/2024    BILITOT 0.3 02/16/2024    ALKPHOS 56 02/16/2024    AST 21 02/16/2024    ALT 43 (H) 02/16/2024             ASSESSMENT & PLAN:    1. CLL stage 0  2.  Reflux  3.  Hypertension  4.  Hyperlipidemia  5.  Type 2 diabetes  6.  Elevated ALT with fatty liver on CT  7.  Colon polyps  8.  Granulomatous calcifications in the spleen on CT    Hematology history timeline:  -2/19/2016 CT angiogram chest [facility] states lymph nodes were normal.  Multiple small pulmonary nodules mostly in the upper lung zone 4-5 mm with recommended follow-up in 6-9 months CT abdomen pelvis showed no adenopathy or splenomegaly.  Spleen was described as normal  -3/16/2022 white count 13,650 hemoglobin 14.7 platelets 209,000.  Absolute lymphocyte count 9960.  CT angiogram chest shows mild bilateral axillary adenopathy right axilla 1.8 x 1.3 cm node, left axilla 2.6 x 2 cm node.  No pulmonary embolus.  Doppler venous imaging negative.  -1/22/2024 CT chest low-dose cancer screening compared to 3/16/2022 shows medial left apical lung scarring.  No mediastinal mass.  Right epicardial lymph node increased from 5 mm now 10 mm.  A couple of right lower epicardial nodes largest previously 12 mm now 14 mm.  Smaller more rounded nodule previously 8 mm now 11 mm.   Axillary adenopathy gradually enlarged.  Right sided node 14 x 18 mm previously now 17 x 21 mm.  Diminished or absent hilar fat suggesting reactive or infiltrative nodes.  Diffuse fatty liver change.  Some shotty retroperitoneal nodes may be present.  Bones normal.  Lung RADS 1 for lung cancer but could have low-grade lymphoproliferative adenopathy.  -1/29/2024 white count 24,500 hemoglobin 15.9 platelets 205,000.  Absolute lymphocyte count 17,930.  Sedimentation rate 7.  LDH normal 197.  Glucose 117 BUN 25 bicarb 21.7 ALT 42 otherwise unremarkable CMP.  ACE level normal 17.    -2/16/2024 [facility] hematology consult: Patient has asymptomatic slowly enlarging adenopathy on screening CT of the chest for lung cancer lung RADS 1.  With this and has rising lymphocyte count as documented above.  No anemia or thrombocytopenia.  No effusions.  No splenomegaly.  No frequent infections or bed drenching night sweats or unexplained fevers or chills.  May well be CLL but we will get his integrated oncology testing performed and I will see him back but even if we confirm this I will not treat him unless he reaches the above criteria.  I will see him back in a few weeks to go over all this.    -2/16/2024 White count 23,880 with hemoglobin 15 platelets 196,000 with absolute lymphocyte count 17,220.  He met a pathology review shows lymphocytosis and 72% of white blood cells with abnormal CD5 clone consistent with CLL.  Hemoglobin 15 platelets 198,000.  Flow cytometry shows abnormal CD5 B-cell population 50% of leukocytes consistent with chronic lymphocytic leukemia/small lymphocytic lymphoma immunophenotype.  CLL FISH panel showed deletion 13 q. but no other genetic aberrancy.  Immunoglobulin variable heavy chain sequencing did not yield interpretable results  ZAP70 positive/CD38 negative/CD49 negative.  P53 negative DNA sequencing.  Clonal immunoglobulin heavy chain and kappa light chain gene rearrangement detected as well as clonal  "T-cell receptor beta gene rearrangement detected.  No BCL1 or Bcl-2 gene rearrangement.  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 loss of heterozygosity and gain of chromosome 21.  Patients with 13 q. deletion is still normally have longer survival compared to other prognostic groups.  By allelic loss which he has does not confer any additional prognostic information and is the same as mono allelic loss.    -3/15/2024 [facility] hematology consult: The above data indicates he has stage 0 CLL with fairly good prognostic features.  For now there is no indication for treatment such as…  Hemoglobin less than 10 not hemolytic  Platelets less than 100,000 not ITP  10 cm or greater nodes or symptomatic nodes  Splenomegaly  Unexplained B symptoms  Frequent infections  Symptomatic effusions  For now we will go with watchful waiting and he will see my nurse practitioner every 3 months for the next year to see with the jerrica of this is.  If he has absolute lymphocyte count doubling time less than 6 months that can be a soft indication for institution of therapy.    -7/15/2024 [facility] Hematology/Oncology clinic follow-up:  Elan is doing well, CBC overall is stable.  White count is a little higher at 30,850 with absolute lymphocytes 26.19, CBC is otherwise normal, no anemia hemoglobin 14.1, hematocrit 42.6%, platelet count is normal at 190,000.  He has no palpable lymphadenopathy.  He has no \"B symptoms\".  We will continue surveillance with repeat CBC in 3 months and I have ordered that today.  I did review indications for treatment and symptoms that need to be reported with the patient today.    Return to clinic in 3 months for follow-up    I spent 20 minutes caring for Elan on this date of service. This time includes time spent by me in the following activities: preparing for the visit, reviewing tests, performing a medically " appropriate examination and/or evaluation, counseling and educating the patient/family/caregiver, documenting information in the medical record, and independently interpreting results and communicating that information with the patient/family/caregiver.     Karuna Goodwin, APRN    07/15/2024

## 2024-07-23 ENCOUNTER — OFFICE VISIT (OUTPATIENT)
Dept: INTERNAL MEDICINE | Facility: CLINIC | Age: 59
End: 2024-07-23
Payer: COMMERCIAL

## 2024-07-23 VITALS
HEIGHT: 70 IN | TEMPERATURE: 98 F | SYSTOLIC BLOOD PRESSURE: 112 MMHG | WEIGHT: 220.8 LBS | BODY MASS INDEX: 31.61 KG/M2 | HEART RATE: 76 BPM | DIASTOLIC BLOOD PRESSURE: 68 MMHG

## 2024-07-23 DIAGNOSIS — I10 ESSENTIAL HYPERTENSION: Chronic | ICD-10-CM

## 2024-07-23 DIAGNOSIS — R20.2 NUMBNESS AND TINGLING: ICD-10-CM

## 2024-07-23 DIAGNOSIS — E11.9 TYPE 2 DIABETES MELLITUS WITHOUT COMPLICATION, WITHOUT LONG-TERM CURRENT USE OF INSULIN: Primary | Chronic | ICD-10-CM

## 2024-07-23 DIAGNOSIS — C91.10 CLL (CHRONIC LYMPHOCYTIC LEUKEMIA): Chronic | ICD-10-CM

## 2024-07-23 DIAGNOSIS — R20.0 NUMBNESS AND TINGLING: ICD-10-CM

## 2024-07-23 LAB
EXPIRATION DATE: ABNORMAL
HBA1C MFR BLD: 6.2 % (ref 4.5–5.7)
Lab: ABNORMAL

## 2024-07-23 PROCEDURE — 83036 HEMOGLOBIN GLYCOSYLATED A1C: CPT | Performed by: STUDENT IN AN ORGANIZED HEALTH CARE EDUCATION/TRAINING PROGRAM

## 2024-07-23 PROCEDURE — 99214 OFFICE O/P EST MOD 30 MIN: CPT | Performed by: STUDENT IN AN ORGANIZED HEALTH CARE EDUCATION/TRAINING PROGRAM

## 2024-07-23 NOTE — Clinical Note
Please request DM optho records from Dr. Rocha at [facility] and [facility] in [place]. Thanks

## 2024-09-08 DIAGNOSIS — F41.1 GAD (GENERALIZED ANXIETY DISORDER): Chronic | ICD-10-CM

## 2024-09-08 DIAGNOSIS — I10 ESSENTIAL HYPERTENSION: Chronic | ICD-10-CM

## 2024-09-09 RX ORDER — HYDROXYZINE HYDROCHLORIDE 25 MG/1
12.5-5 TABLET, FILM COATED ORAL NIGHTLY PRN
Qty: 90 TABLET | Refills: 1 | Status: SHIPPED | OUTPATIENT
Start: 2024-09-09

## 2024-09-09 RX ORDER — LOSARTAN POTASSIUM 50 MG/1
50 TABLET ORAL DAILY
Qty: 90 TABLET | Refills: 1 | Status: SHIPPED | OUTPATIENT
Start: 2024-09-09

## 2024-09-09 RX ORDER — NIFEDIPINE 30 MG/1
30 TABLET, EXTENDED RELEASE ORAL DAILY
Qty: 90 TABLET | Refills: 1 | Status: SHIPPED | OUTPATIENT
Start: 2024-09-09

## 2024-09-09 NOTE — TELEPHONE ENCOUNTER
Rx Refill Note  Requested Prescriptions     Pending Prescriptions Disp Refills    hydrOXYzine (ATARAX) 25 MG tablet 90 tablet 1     Sig: Take 0.5-2 tablets by mouth At Night As Needed for Anxiety (or sleep).      Last office visit with prescribing clinician: 7/23/2024   Last telemedicine visit with prescribing clinician: Visit date not found   Next office visit with prescribing clinician: 11/25/2024                         Would you like a call back once the refill request has been completed: [] Yes [] No    If the office needs to give you a call back, can they leave a voicemail: [] Yes [] No    Ana Garvey LPN  09/09/24, 08:47 EDT

## 2024-09-27 DIAGNOSIS — E11.9 TYPE 2 DIABETES MELLITUS WITHOUT COMPLICATION, WITHOUT LONG-TERM CURRENT USE OF INSULIN: Chronic | ICD-10-CM

## 2024-09-28 RX ORDER — METFORMIN HCL 500 MG
500 TABLET, EXTENDED RELEASE 24 HR ORAL
Qty: 90 TABLET | Refills: 0 | Status: SHIPPED | OUTPATIENT
Start: 2024-09-28

## 2024-10-07 ENCOUNTER — LAB (OUTPATIENT)
Dept: LAB | Facility: HOSPITAL | Age: 59
End: 2024-10-07
Payer: COMMERCIAL

## 2024-10-07 ENCOUNTER — OFFICE VISIT (OUTPATIENT)
Dept: ONCOLOGY | Facility: CLINIC | Age: 59
End: 2024-10-07
Payer: COMMERCIAL

## 2024-10-07 VITALS
HEART RATE: 62 BPM | WEIGHT: 224 LBS | SYSTOLIC BLOOD PRESSURE: 147 MMHG | TEMPERATURE: 98.2 F | DIASTOLIC BLOOD PRESSURE: 78 MMHG | OXYGEN SATURATION: 95 % | HEIGHT: 70 IN | RESPIRATION RATE: 18 BRPM | BODY MASS INDEX: 32.07 KG/M2

## 2024-10-07 DIAGNOSIS — C91.10 CLL (CHRONIC LYMPHOCYTIC LEUKEMIA): ICD-10-CM

## 2024-10-07 DIAGNOSIS — C91.10 CLL (CHRONIC LYMPHOCYTIC LEUKEMIA): Primary | Chronic | ICD-10-CM

## 2024-10-07 LAB
BASOPHILS # BLD AUTO: 0.06 10*3/MM3 (ref 0–0.2)
BASOPHILS NFR BLD AUTO: 0.2 % (ref 0–1.5)
DEPRECATED RDW RBC AUTO: 45.4 FL (ref 37–54)
EOSINOPHIL # BLD AUTO: 0.21 10*3/MM3 (ref 0–0.4)
EOSINOPHIL NFR BLD AUTO: 0.6 % (ref 0.3–6.2)
ERYTHROCYTE [DISTWIDTH] IN BLOOD BY AUTOMATED COUNT: 13.3 % (ref 12.3–15.4)
HCT VFR BLD AUTO: 43.4 % (ref 37.5–51)
HGB BLD-MCNC: 14.6 G/DL (ref 13–17.7)
IMM GRANULOCYTES # BLD AUTO: 0.03 10*3/MM3 (ref 0–0.05)
IMM GRANULOCYTES NFR BLD AUTO: 0.1 % (ref 0–0.5)
LYMPHOCYTES # BLD AUTO: 28.67 10*3/MM3 (ref 0.7–3.1)
LYMPHOCYTES NFR BLD AUTO: 85.7 % (ref 19.6–45.3)
MCH RBC QN AUTO: 30.7 PG (ref 26.6–33)
MCHC RBC AUTO-ENTMCNC: 33.6 G/DL (ref 31.5–35.7)
MCV RBC AUTO: 91.4 FL (ref 79–97)
MONOCYTES # BLD AUTO: 0.66 10*3/MM3 (ref 0.1–0.9)
MONOCYTES NFR BLD AUTO: 2 % (ref 5–12)
NEUTROPHILS NFR BLD AUTO: 11.4 % (ref 42.7–76)
NEUTROPHILS NFR BLD AUTO: 3.84 10*3/MM3 (ref 1.7–7)
PLATELET # BLD AUTO: 189 10*3/MM3 (ref 140–450)
PMV BLD AUTO: 9.7 FL (ref 6–12)
RBC # BLD AUTO: 4.75 10*6/MM3 (ref 4.14–5.8)
WBC NRBC COR # BLD AUTO: 33.47 10*3/MM3 (ref 3.4–10.8)

## 2024-10-07 PROCEDURE — 36415 COLL VENOUS BLD VENIPUNCTURE: CPT

## 2024-10-07 PROCEDURE — 85025 COMPLETE CBC W/AUTO DIFF WBC: CPT

## 2024-10-07 PROCEDURE — 99213 OFFICE O/P EST LOW 20 MIN: CPT | Performed by: NURSE PRACTITIONER

## 2024-10-07 RX ORDER — PYRIDOXINE HCL (VITAMIN B6) 50 MG
1 TABLET ORAL DAILY
COMMUNITY
Start: 2024-09-13

## 2024-10-07 RX ORDER — ERGOCALCIFEROL 1.25 MG/1
50000 CAPSULE, LIQUID FILLED ORAL DAILY
COMMUNITY
Start: 2024-10-06

## 2024-10-07 RX ORDER — MULTIVITAMIN WITH IRON
1 TABLET ORAL DAILY
COMMUNITY
Start: 2024-09-12

## 2024-10-07 NOTE — PROGRESS NOTES
CHIEF COMPLAINT: CLL    Problem List:  Oncology/Hematology History Overview Note   1. CLL stage 0  2.  Reflux  3.  Hypertension  4.  Hyperlipidemia  5.  Type 2 diabetes  6.  Elevated ALT with fatty liver on CT  7.  Colon polyps  8.  Granulomatous calcifications in the spleen on CT    Hematology history timeline:  -2/19/2016 CT angiogram chest [facility] states lymph nodes were normal.  Multiple small pulmonary nodules mostly in the upper lung zone 4-5 mm with recommended follow-up in 6-9 months CT abdomen pelvis showed no adenopathy or splenomegaly.  Spleen was described as normal  -3/16/2022 white count 13,650 hemoglobin 14.7 platelets 209,000.  Absolute lymphocyte count 9960.  CT angiogram chest shows mild bilateral axillary adenopathy right axilla 1.8 x 1.3 cm node, left axilla 2.6 x 2 cm node.  No pulmonary embolus.  Doppler venous imaging negative.  -1/22/2024 CT chest low-dose cancer screening compared to 3/16/2022 shows medial left apical lung scarring.  No mediastinal mass.  Right epicardial lymph node increased from 5 mm now 10 mm.  A couple of right lower epicardial nodes largest previously 12 mm now 14 mm.  Smaller more rounded nodule previously 8 mm now 11 mm.  Axillary adenopathy gradually enlarged.  Right sided node 14 x 18 mm previously now 17 x 21 mm.  Diminished or absent hilar fat suggesting reactive or infiltrative nodes.  Diffuse fatty liver change.  Some shotty retroperitoneal nodes may be present.  Bones normal.  Lung RADS 1 for lung cancer but could have low-grade lymphoproliferative adenopathy.  -1/29/2024 white count 24,500 hemoglobin 15.9 platelets 205,000.  Absolute lymphocyte count 17,930.  Sedimentation rate 7.  LDH normal 197.  Glucose 117 BUN 25 bicarb 21.7 ALT 42 otherwise unremarkable CMP.  ACE level normal 17.    -2/16/2024 [facility] hematology consult: Patient has asymptomatic slowly enlarging adenopathy on screening CT of the chest for lung cancer lung RADS 1.  With this  and has rising lymphocyte count as documented above.  No anemia or thrombocytopenia.  No effusions.  No splenomegaly.  No frequent infections or bed drenching night sweats or unexplained fevers or chills.  May well be CLL but we will get his integrated oncology testing performed and I will see him back but even if we confirm this I will not treat him unless he reaches the above criteria.  I will see him back in a few weeks to go over all this.    -2/16/2024 White count 23,880 with hemoglobin 15 platelets 196,000 with absolute lymphocyte count 17,220.  He met a pathology review shows lymphocytosis and 72% of white blood cells with abnormal CD5 clone consistent with CLL.  Hemoglobin 15 platelets 198,000.  Flow cytometry shows abnormal CD5 B-cell population 50% of leukocytes consistent with chronic lymphocytic leukemia/small lymphocytic lymphoma immunophenotype.  CLL FISH panel showed deletion 13 q. but no other genetic aberrancy.  Immunoglobulin variable heavy chain sequencing did not yield interpretable results  ZAP70 positive/CD38 negative/CD49 negative.  P53 negative DNA sequencing.  Clonal immunoglobulin heavy chain and kappa light chain gene rearrangement detected as well as clonal T-cell receptor beta gene rearrangement detected.  No BCL1 or Bcl-2 gene rearrangement.  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 loss of heterozygosity and gain of chromosome 21.  Patients with 13 q. deletion is still normally have longer survival compared to other prognostic groups.  By allelic loss which he has does not confer any additional prognostic information and is the same as mono allelic loss.    -3/15/2024 [facility] hematology consult: The above data indicates he has stage 0 CLL with fairly good prognostic features.  For now there is no indication for treatment such as…  Hemoglobin less than 10 not hemolytic  Platelets less than 100,000  "not ITP  10 cm or greater nodes or symptomatic nodes  Splenomegaly  Unexplained B symptoms  Frequent infections  Symptomatic effusions  For now we will go with watchful waiting and he will see my nurse practitioner every 3 months for the next year to see with the jerrica of this is.  If he has absolute lymphocyte count doubling time less than 6 months that can be a soft indication for institution of therapy.    -7/15/2024 [facility] Hematology/Oncology clinic follow-up:  Elan is doing well, CBC overall is stable.  White count is a little higher at 30,850 with absolute lymphocytes 26.19, CBC is otherwise normal, no anemia hemoglobin 14.1, hematocrit 42.6%, platelet count is normal at 190,000.  He has no palpable lymphadenopathy.  He has no \"B symptoms\".  We will continue surveillance with repeat CBC in 3 months and I have ordered that today.      CLL (chronic lymphocytic leukemia)   3/15/2024 Initial Diagnosis    CLL (chronic lymphocytic leukemia)         HISTORY OF PRESENT ILLNESS:  The patient is a 59 y.o. male, here for follow up on management of CLL.  Elan has been doing well since we saw him last with no new concerns.  He remains active.  He has had no infections or illnesses.  His appetite is normal, weight is stable, no change in his bowel or bladder habits.  No fevers or chills, no bit drenching night sweats.    Past Medical History:   Diagnosis Date    Anxiety     Arthritis     Essential hypertension 03/18/2022    JACK (generalized anxiety disorder) 03/18/2022    Gastroesophageal reflux disease without esophagitis 11/20/2023    History of COVID-19 03/18/2022    Mixed hyperlipidemia 11/21/2023    Primary osteoarthritis of both shoulders 11/22/2023    Type 2 diabetes mellitus without complication, without long-term current use of insulin 11/21/2023 11/2023 A1c 6.7     History reviewed. No pertinent surgical history.    No Known Allergies    Family History and Social History reviewed and changed as " "necessary    REVIEW OF SYSTEM:   No new somatic concerns    PHYSICAL EXAM:  Well-developed, well-nourished healthy appearing male in no distress  Respirations regular and unlabored, lungs clear  No palpable lymphadenopathy    Vitals:    10/07/24 1004   BP: 147/78   Pulse: 62   Resp: 18   Temp: 98.2 °F (36.8 °C)   SpO2: 95%   Weight: 102 kg (224 lb)   Height: 177.8 cm (70\")     Vitals:    10/07/24 1004   PainSc: 0-No pain          ECOG score: 0           Vitals reviewed.  Labs reviewed    ECOG: (0) Fully Active - Able to Carry On All Pre-disease Performance Without Restriction    Lab Results   Component Value Date    HGB 14.6 10/07/2024    HCT 43.4 10/07/2024    MCV 91.4 10/07/2024     10/07/2024    WBC 33.47 (H) 10/07/2024    NEUTROABS 3.84 10/07/2024    LYMPHSABS 28.67 (H) 10/07/2024    MONOSABS 0.66 10/07/2024    EOSABS 0.21 10/07/2024    BASOSABS 0.06 10/07/2024       Lab Results   Component Value Date    GLUCOSE 91 02/16/2024    BUN 24 (H) 02/16/2024    CREATININE 1.01 02/16/2024     02/16/2024    K 4.4 02/16/2024     02/16/2024    CO2 24.0 02/16/2024    CALCIUM 9.3 02/16/2024    PROTEINTOT 7.2 02/16/2024    ALBUMIN 4.6 02/16/2024    BILITOT 0.3 02/16/2024    ALKPHOS 56 02/16/2024    AST 21 02/16/2024    ALT 43 (H) 02/16/2024             ASSESSMENT & PLAN:    1.  Stage 0 CLL: Elan continues to do well, no progression of his CLL on clinical exam and his CBC remains stable.  He has no palpable lymphadenopathy, no \"B symptoms\".  CBC as shown above with mild increase in WBC from last at current 33.47 with absolute lymphocytes 28.67 otherwise normal CBC, no doubling of his lymphocyte over time.  We will repeat in 3 months.  If this remains stable when we get to March 2025 we will then go to every 6-month monitoring.  We discussed vaccines, he has had his shingles and current flu vaccine.  He is going to discuss with his PCP in the RSV vaccine.    I spent 21 minutes caring for Elan on this date of " service. This time includes time spent by me in the following activities: preparing for the visit, reviewing tests, performing a medically appropriate examination and/or evaluation, ordering medications, tests, or procedures, documenting information in the medical record, and independently interpreting results and communicating that information with the patient/family/caregiver.     Karuna Goodwin, APRN    10/07/2024

## 2024-10-23 ENCOUNTER — LAB (OUTPATIENT)
Dept: LAB | Facility: HOSPITAL | Age: 59
End: 2024-10-23
Payer: COMMERCIAL

## 2024-10-23 ENCOUNTER — OFFICE VISIT (OUTPATIENT)
Dept: NEUROLOGY | Facility: CLINIC | Age: 59
End: 2024-10-23
Payer: COMMERCIAL

## 2024-10-23 VITALS
OXYGEN SATURATION: 98 % | HEIGHT: 70 IN | BODY MASS INDEX: 32.01 KG/M2 | SYSTOLIC BLOOD PRESSURE: 130 MMHG | HEART RATE: 70 BPM | DIASTOLIC BLOOD PRESSURE: 68 MMHG | WEIGHT: 223.6 LBS

## 2024-10-23 DIAGNOSIS — C91.10 CLL (CHRONIC LYMPHOCYTIC LEUKEMIA): ICD-10-CM

## 2024-10-23 DIAGNOSIS — R20.2 NUMBNESS AND TINGLING: Primary | ICD-10-CM

## 2024-10-23 DIAGNOSIS — C91.10 CLL (CHRONIC LYMPHOCYTIC LEUKEMIA): Chronic | ICD-10-CM

## 2024-10-23 DIAGNOSIS — R20.2 NUMBNESS AND TINGLING: ICD-10-CM

## 2024-10-23 DIAGNOSIS — R73.03 PREDIABETES: ICD-10-CM

## 2024-10-23 DIAGNOSIS — R20.0 NUMBNESS AND TINGLING: ICD-10-CM

## 2024-10-23 DIAGNOSIS — R20.0 NUMBNESS AND TINGLING: Primary | ICD-10-CM

## 2024-10-23 LAB
BASOPHILS # BLD AUTO: 0.12 10*3/MM3 (ref 0–0.2)
BASOPHILS NFR BLD AUTO: 0.4 % (ref 0–1.5)
DEPRECATED RDW RBC AUTO: 45.4 FL (ref 37–54)
EOSINOPHIL # BLD AUTO: 0.23 10*3/MM3 (ref 0–0.4)
EOSINOPHIL NFR BLD AUTO: 0.7 % (ref 0.3–6.2)
ERYTHROCYTE [DISTWIDTH] IN BLOOD BY AUTOMATED COUNT: 13.5 % (ref 12.3–15.4)
HCT VFR BLD AUTO: 42.6 % (ref 37.5–51)
HGB BLD-MCNC: 14.1 G/DL (ref 13–17.7)
IMM GRANULOCYTES # BLD AUTO: 0.05 10*3/MM3 (ref 0–0.05)
IMM GRANULOCYTES NFR BLD AUTO: 0.1 % (ref 0–0.5)
LYMPHOCYTES # BLD AUTO: 28.91 10*3/MM3 (ref 0.7–3.1)
LYMPHOCYTES NFR BLD AUTO: 86.4 % (ref 19.6–45.3)
MCH RBC QN AUTO: 30.3 PG (ref 26.6–33)
MCHC RBC AUTO-ENTMCNC: 33.1 G/DL (ref 31.5–35.7)
MCV RBC AUTO: 91.4 FL (ref 79–97)
MONOCYTES # BLD AUTO: 0.73 10*3/MM3 (ref 0.1–0.9)
MONOCYTES NFR BLD AUTO: 2.2 % (ref 5–12)
NEUTROPHILS NFR BLD AUTO: 10.2 % (ref 42.7–76)
NEUTROPHILS NFR BLD AUTO: 3.41 10*3/MM3 (ref 1.7–7)
NRBC BLD AUTO-RTO: 0 /100 WBC (ref 0–0.2)
PLAT MORPH BLD: NORMAL
PLATELET # BLD AUTO: 211 10*3/MM3 (ref 140–450)
PMV BLD AUTO: 10.4 FL (ref 6–12)
RBC # BLD AUTO: 4.66 10*6/MM3 (ref 4.14–5.8)
RBC MORPH BLD: NORMAL
SMUDGE CELLS BLD QL SMEAR: NORMAL
WBC NRBC COR # BLD AUTO: 33.45 10*3/MM3 (ref 3.4–10.8)

## 2024-10-23 PROCEDURE — 83825 ASSAY OF MERCURY: CPT

## 2024-10-23 PROCEDURE — 82570 ASSAY OF URINE CREATININE: CPT

## 2024-10-23 PROCEDURE — 82175 ASSAY OF ARSENIC: CPT

## 2024-10-23 PROCEDURE — 83655 ASSAY OF LEAD: CPT

## 2024-10-23 PROCEDURE — 82550 ASSAY OF CK (CPK): CPT

## 2024-10-23 PROCEDURE — 85007 BL SMEAR W/DIFF WBC COUNT: CPT

## 2024-10-23 PROCEDURE — 84443 ASSAY THYROID STIM HORMONE: CPT

## 2024-10-23 PROCEDURE — 99214 OFFICE O/P EST MOD 30 MIN: CPT | Performed by: NURSE PRACTITIONER

## 2024-10-23 PROCEDURE — 82595 ASSAY OF CRYOGLOBULIN: CPT

## 2024-10-23 PROCEDURE — 82300 ASSAY OF CADMIUM: CPT

## 2024-10-23 PROCEDURE — 86431 RHEUMATOID FACTOR QUANT: CPT

## 2024-10-23 PROCEDURE — 86140 C-REACTIVE PROTEIN: CPT

## 2024-10-23 PROCEDURE — 86200 CCP ANTIBODY: CPT

## 2024-10-23 PROCEDURE — 86038 ANTINUCLEAR ANTIBODIES: CPT

## 2024-10-23 PROCEDURE — 36415 COLL VENOUS BLD VENIPUNCTURE: CPT

## 2024-10-23 PROCEDURE — 85025 COMPLETE CBC W/AUTO DIFF WBC: CPT

## 2024-10-23 PROCEDURE — 86617 LYME DISEASE ANTIBODY: CPT

## 2024-10-23 RX ORDER — LOSARTAN POTASSIUM 25 MG/1
25 TABLET ORAL DAILY
COMMUNITY
Start: 2024-10-15

## 2024-10-23 RX ORDER — MULTIVITAMIN WITH IRON
100 TABLET ORAL DAILY
Qty: 90 TABLET | Refills: 1 | Status: SHIPPED | OUTPATIENT
Start: 2024-10-23
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
## 2024-10-24 LAB
CK SERPL-CCNC: 135 U/L (ref 20–200)
CRP SERPL-MCNC: <0.3 MG/DL (ref 0–0.5)
TSH SERPL DL<=0.05 MIU/L-ACNC: 3.78 UIU/ML (ref 0.27–4.2)

## 2024-10-25 LAB
CCP IGA+IGG SERPL IA-ACNC: 6 UNITS (ref 0–19)
RHEUMATOID FACT SERPL-ACNC: <10 IU/ML

## 2024-10-28 LAB
ANA SER QL IF: NEGATIVE
CRYOGLOB SER QL 1D COLD INC: NORMAL
LABORATORY COMMENT REPORT: NORMAL

## 2024-10-29 LAB
B BURGDOR IGG PATRN SER IB-IMP: NEGATIVE
B BURGDOR IGM PATRN SER IB-IMP: NEGATIVE
B BURGDOR18KD IGG SER QL IB: NORMAL
B BURGDOR23KD IGG SER QL IB: NORMAL
B BURGDOR23KD IGM SER QL IB: NORMAL
B BURGDOR28KD IGG SER QL IB: NORMAL
B BURGDOR30KD IGG SER QL IB: NORMAL
B BURGDOR39KD IGG SER QL IB: NORMAL
B BURGDOR39KD IGM SER QL IB: NORMAL
B BURGDOR41KD IGG SER QL IB: NORMAL
B BURGDOR41KD IGM SER QL IB: NORMAL
B BURGDOR45KD IGG SER QL IB: NORMAL
B BURGDOR58KD IGG SER QL IB: NORMAL
B BURGDOR66KD IGG SER QL IB: NORMAL
B BURGDOR93KD IGG SER QL IB: NORMAL

## 2024-11-27 ENCOUNTER — TELEPHONE (OUTPATIENT)
Dept: NEUROLOGY | Facility: CLINIC | Age: 59
End: 2024-11-27
Payer: COMMERCIAL

## 2024-11-27 LAB
ARSENIC UR-MCNC: 48 UG/L (ref 0–9)
ARSENIC.INORGANIC+METHYLATED 24H UR-MCNC: <20 UG/L
ARSENIC/CREAT UR: 35 UG/G CREAT
CADMIUM 24H UR-MCNC: ABNORMAL UG/L
CREAT UR-MCNC: 1.36 G/L (ref 0.3–3)
DIMETHYLARSINATE UR-MCNC: <5 UG/L
INORG ARSENIC UR-MCNC: <10 UG/L
LEAD 24H UR-MCNC: ABNORMAL UG/L (ref 0–49)
Lab: NORMAL
MERCURY 24H UR-MCNC: ABNORMAL UG/L (ref 0–19)
MMA UR-MCNC: <5 UG/L

## 2024-11-27 NOTE — TELEPHONE ENCOUNTER
Called patient and gave results.    ----- Message from Annalise Sood sent at 11/27/2024 12:47 PM EST -----  Please notify pt labs for heavy metal toxins, lyme disease, cryoglobulins, connective tissue autoimmune diseases, rheumatoid arthritis, muscle breakdown, inflammatory markers, thyroid are all normal.

## 2024-12-21 DIAGNOSIS — I10 ESSENTIAL HYPERTENSION: Chronic | ICD-10-CM

## 2024-12-23 RX ORDER — NIFEDIPINE 30 MG/1
30 TABLET, EXTENDED RELEASE ORAL DAILY
Qty: 90 TABLET | Refills: 1 | Status: SHIPPED | OUTPATIENT
Start: 2024-12-23

## 2024-12-23 NOTE — TELEPHONE ENCOUNTER
Rx Refill Note  Requested Prescriptions     Pending Prescriptions Disp Refills    NIFEdipine XL (PROCARDIA XL) 30 MG 24 hr tablet 90 tablet 1     Sig: Take 1 tablet by mouth Daily.      Last office visit with prescribing clinician: Visit date not found   Last telemedicine visit with prescribing clinician: Visit date not found   Next office visit with prescribing clinician: Visit date not found                         Would you like a call back once the refill request has been completed: [] Yes [] No    If the office needs to give you a call back, can they leave a voicemail: [] Yes [] No    Mariposa Nuno MA  12/23/24, 08:44 EST

## 2025-01-12 DIAGNOSIS — F41.1 GAD (GENERALIZED ANXIETY DISORDER): Chronic | ICD-10-CM

## 2025-01-13 RX ORDER — HYDROXYZINE HYDROCHLORIDE 25 MG/1
TABLET, FILM COATED ORAL
Qty: 90 TABLET | Refills: 1 | Status: SHIPPED | OUTPATIENT
Start: 2025-01-13

## 2025-01-14 ENCOUNTER — LAB (OUTPATIENT)
Dept: LAB | Facility: HOSPITAL | Age: 60
End: 2025-01-14
Payer: COMMERCIAL

## 2025-01-14 ENCOUNTER — OFFICE VISIT (OUTPATIENT)
Dept: INTERNAL MEDICINE | Facility: CLINIC | Age: 60
End: 2025-01-14
Payer: COMMERCIAL

## 2025-01-14 ENCOUNTER — OFFICE VISIT (OUTPATIENT)
Dept: ONCOLOGY | Facility: CLINIC | Age: 60
End: 2025-01-14
Payer: COMMERCIAL

## 2025-01-14 VITALS
TEMPERATURE: 98.4 F | HEIGHT: 70 IN | BODY MASS INDEX: 31.92 KG/M2 | DIASTOLIC BLOOD PRESSURE: 86 MMHG | HEART RATE: 80 BPM | OXYGEN SATURATION: 96 % | SYSTOLIC BLOOD PRESSURE: 146 MMHG | WEIGHT: 223 LBS

## 2025-01-14 VITALS
DIASTOLIC BLOOD PRESSURE: 96 MMHG | HEIGHT: 70 IN | SYSTOLIC BLOOD PRESSURE: 150 MMHG | BODY MASS INDEX: 31.5 KG/M2 | RESPIRATION RATE: 20 BRPM | HEART RATE: 76 BPM | OXYGEN SATURATION: 96 % | TEMPERATURE: 97.8 F | WEIGHT: 220 LBS

## 2025-01-14 DIAGNOSIS — C91.10 CLL (CHRONIC LYMPHOCYTIC LEUKEMIA): Chronic | ICD-10-CM

## 2025-01-14 DIAGNOSIS — Z86.0100 HISTORY OF COLON POLYPS: ICD-10-CM

## 2025-01-14 DIAGNOSIS — E11.9 TYPE 2 DIABETES MELLITUS WITHOUT COMPLICATION, WITHOUT LONG-TERM CURRENT USE OF INSULIN: Chronic | ICD-10-CM

## 2025-01-14 DIAGNOSIS — C91.10 CLL (CHRONIC LYMPHOCYTIC LEUKEMIA): Primary | Chronic | ICD-10-CM

## 2025-01-14 DIAGNOSIS — C91.10 CLL (CHRONIC LYMPHOCYTIC LEUKEMIA): ICD-10-CM

## 2025-01-14 DIAGNOSIS — Z12.5 ENCOUNTER FOR SCREENING PROSTATE SPECIFIC ANTIGEN (PSA) MEASUREMENT: ICD-10-CM

## 2025-01-14 DIAGNOSIS — E66.811 OBESITY, CLASS I, BMI 30-34.9: ICD-10-CM

## 2025-01-14 DIAGNOSIS — E78.2 MIXED HYPERLIPIDEMIA: Chronic | ICD-10-CM

## 2025-01-14 DIAGNOSIS — Z12.11 SCREEN FOR COLON CANCER: ICD-10-CM

## 2025-01-14 DIAGNOSIS — Z87.891 PERSONAL HISTORY OF TOBACCO USE, PRESENTING HAZARDS TO HEALTH: ICD-10-CM

## 2025-01-14 DIAGNOSIS — I10 ESSENTIAL HYPERTENSION: Chronic | ICD-10-CM

## 2025-01-14 DIAGNOSIS — Z00.00 WELL ADULT EXAM: Primary | ICD-10-CM

## 2025-01-14 LAB
ALBUMIN SERPL-MCNC: 4.7 G/DL (ref 3.5–5.2)
ALBUMIN/GLOB SERPL: 1.7 G/DL
ALP SERPL-CCNC: 60 U/L (ref 39–117)
ALT SERPL W P-5'-P-CCNC: 27 U/L (ref 1–41)
ANION GAP SERPL CALCULATED.3IONS-SCNC: 11 MMOL/L (ref 5–15)
AST SERPL-CCNC: 24 U/L (ref 1–40)
BASOPHILS # BLD AUTO: 0.04 10*3/MM3 (ref 0–0.2)
BASOPHILS NFR BLD AUTO: 0.1 % (ref 0–1.5)
BILIRUB SERPL-MCNC: 0.3 MG/DL (ref 0–1.2)
BUN SERPL-MCNC: 17 MG/DL (ref 6–20)
BUN/CREAT SERPL: 16.2 (ref 7–25)
CALCIUM SPEC-SCNC: 9.5 MG/DL (ref 8.6–10.5)
CHLORIDE SERPL-SCNC: 104 MMOL/L (ref 98–107)
CHOLEST SERPL-MCNC: 191 MG/DL (ref 0–200)
CO2 SERPL-SCNC: 27 MMOL/L (ref 22–29)
CREAT SERPL-MCNC: 1.05 MG/DL (ref 0.76–1.27)
DEPRECATED RDW RBC AUTO: 43.8 FL (ref 37–54)
EGFRCR SERPLBLD CKD-EPI 2021: 82.3 ML/MIN/1.73
EOSINOPHIL # BLD AUTO: 0.2 10*3/MM3 (ref 0–0.4)
EOSINOPHIL NFR BLD AUTO: 0.7 % (ref 0.3–6.2)
ERYTHROCYTE [DISTWIDTH] IN BLOOD BY AUTOMATED COUNT: 13 % (ref 12.3–15.4)
EXPIRATION DATE: ABNORMAL
GLOBULIN UR ELPH-MCNC: 2.7 GM/DL
GLUCOSE SERPL-MCNC: 86 MG/DL (ref 65–99)
HBA1C MFR BLD: 6.2 % (ref 4.5–5.7)
HCT VFR BLD AUTO: 44.9 % (ref 37.5–51)
HDLC SERPL-MCNC: 41 MG/DL (ref 40–60)
HGB BLD-MCNC: 15.1 G/DL (ref 13–17.7)
IMM GRANULOCYTES # BLD AUTO: 0.03 10*3/MM3 (ref 0–0.05)
IMM GRANULOCYTES NFR BLD AUTO: 0.1 % (ref 0–0.5)
LDLC SERPL CALC-MCNC: 120 MG/DL (ref 0–100)
LDLC/HDLC SERPL: 2.84 {RATIO}
LYMPHOCYTES # BLD AUTO: 24.09 10*3/MM3 (ref 0.7–3.1)
LYMPHOCYTES NFR BLD AUTO: 82.8 % (ref 19.6–45.3)
Lab: ABNORMAL
MCH RBC QN AUTO: 30.3 PG (ref 26.6–33)
MCHC RBC AUTO-ENTMCNC: 33.6 G/DL (ref 31.5–35.7)
MCV RBC AUTO: 90 FL (ref 79–97)
MONOCYTES # BLD AUTO: 0.7 10*3/MM3 (ref 0.1–0.9)
MONOCYTES NFR BLD AUTO: 2.4 % (ref 5–12)
NEUTROPHILS NFR BLD AUTO: 13.9 % (ref 42.7–76)
NEUTROPHILS NFR BLD AUTO: 4.02 10*3/MM3 (ref 1.7–7)
PLATELET # BLD AUTO: 207 10*3/MM3 (ref 140–450)
PMV BLD AUTO: 9.6 FL (ref 6–12)
POTASSIUM SERPL-SCNC: 4.2 MMOL/L (ref 3.5–5.2)
PROT SERPL-MCNC: 7.4 G/DL (ref 6–8.5)
RBC # BLD AUTO: 4.99 10*6/MM3 (ref 4.14–5.8)
SODIUM SERPL-SCNC: 142 MMOL/L (ref 136–145)
TRIGL SERPL-MCNC: 167 MG/DL (ref 0–150)
VLDLC SERPL-MCNC: 30 MG/DL (ref 5–40)
WBC NRBC COR # BLD AUTO: 29.08 10*3/MM3 (ref 3.4–10.8)

## 2025-01-14 PROCEDURE — 99213 OFFICE O/P EST LOW 20 MIN: CPT | Performed by: NURSE PRACTITIONER

## 2025-01-14 PROCEDURE — 36415 COLL VENOUS BLD VENIPUNCTURE: CPT

## 2025-01-14 PROCEDURE — 99396 PREV VISIT EST AGE 40-64: CPT | Performed by: STUDENT IN AN ORGANIZED HEALTH CARE EDUCATION/TRAINING PROGRAM

## 2025-01-14 PROCEDURE — 85025 COMPLETE CBC W/AUTO DIFF WBC: CPT

## 2025-01-14 PROCEDURE — 80061 LIPID PANEL: CPT

## 2025-01-14 PROCEDURE — 99214 OFFICE O/P EST MOD 30 MIN: CPT | Performed by: STUDENT IN AN ORGANIZED HEALTH CARE EDUCATION/TRAINING PROGRAM

## 2025-01-14 PROCEDURE — 83036 HEMOGLOBIN GLYCOSYLATED A1C: CPT | Performed by: STUDENT IN AN ORGANIZED HEALTH CARE EDUCATION/TRAINING PROGRAM

## 2025-01-14 PROCEDURE — G0103 PSA SCREENING: HCPCS

## 2025-01-14 PROCEDURE — 80053 COMPREHEN METABOLIC PANEL: CPT

## 2025-01-14 NOTE — PROGRESS NOTES
"Chief Complaint  Elan Fered is a 59 y.o. male presenting for Annual Exam.     From [place]. Lives with wife. Has son born 1992. Has own business - building swimming pools since 1987. Twin brother passed unexpectedly May 2022.     Patient has a past medical history of CLL (3/2024, [facility], f/u dr. Mcdonough), hypertension, T2DM (11/2023), JACK, GERD and uncomplicated COVID-19 (3/16/22).    History of Present Illness  Patient is here for annual physical and follow-up with additional questions.    Patient tells me he ran out of losartan a couple days ago, he just got it refilled and took his first dose again earlier.    He is overall doing well.  No fevers or sweats,  no weight loss.  He has appointment today with heme-onc for follow-up of his CLL.    He is concerned about his weight and would like to start on medication for diabetes and weight loss.  He feels the neuropathy also gets worse when his weight goes up.  He continues to take metformin for his diabetes.    He is following with neurology, they recommended MRI of his brain, he was told co-pay would be $700 and he is considering working at outside radiology if there would be cheaper options.    He does try to exercise, and would like to go back to the gym.  When the weather allows he does go for walks.    He goes to the dentist at least once yearly.  He would like to continue screening for lung cancer, he quit smoking in 2021.  He is due for repeat LDCT later this month.  They also recommended 3-year repeat colonoscopy due to polyps, he is due for repeat in the spring.    The following portions of the patient's history were reviewed and updated as appropriate: allergies, current medications, past family history, past medical history, past social history, past surgical history, and problem list.    Objective  /86 (BP Location: Left arm, Patient Position: Sitting, Cuff Size: Large Adult)   Pulse 80   Temp 98.4 °F (36.9 °C) (Infrared)   Ht 177.8 cm (70\")  "  Wt 101 kg (223 lb)   SpO2 96%   BMI 32.00 kg/m²     Physical Exam  Vitals reviewed.   Constitutional:       Appearance: Normal appearance.   HENT:      Head: Normocephalic and atraumatic.      Nose: Nose normal. No congestion.      Mouth/Throat:      Mouth: Mucous membranes are moist.      Pharynx: Oropharynx is clear.   Eyes:      Extraocular Movements: Extraocular movements intact.      Conjunctiva/sclera: Conjunctivae normal.   Cardiovascular:      Rate and Rhythm: Normal rate and regular rhythm.      Heart sounds: Normal heart sounds. No murmur heard.  Pulmonary:      Effort: Pulmonary effort is normal.      Breath sounds: Normal breath sounds.   Musculoskeletal:      Cervical back: Neck supple.      Right lower leg: No edema.      Left lower leg: No edema.   Skin:     General: Skin is warm and dry.   Neurological:      Mental Status: He is alert and oriented to person, place, and time. Mental status is at baseline.   Psychiatric:         Behavior: Behavior normal.         Thought Content: Thought content normal.         Assessment/Plan   1. Well adult exam  Counseled on recommendations for annual flu shot, COVID booster and hepatitis B vaccine.  Also consider shingles vaccine.  Unfortunately we are out of the flu vaccine here today, so I have encouraged him to get it done at the pharmacy.  Counseled on recommendations for moderate intensity exercise 2.5 hours weekly.  Counseled on recommendations for regular dental visits, at least annually, ideally twice yearly.  Counseled on recommendations for continued lung cancer screening.  Counseled on recommendations for continued colorectal cancer screening with colonoscopy every 3  3-5 years due to history of colon polyps.  He is due for repeat this year.    2. Type 2 diabetes mellitus without complication, without long-term current use of insulin  Hemoglobin A1C   Date Value Ref Range Status   01/14/2025 6.2 (A) 4.5 - 5.7 % Final   07/23/2024 6.2 (A) 4.5 - 5.7 %  Final   04/23/2024 6.4 (A) 4.5 - 5.7 % Final   01/29/2024 6.70 (H) 4.80 - 5.60 % Final   11/20/2023 6.70 (H) 4.80 - 5.60 % Final   Good glycemic control.  Continue on metformin.  He is interested in trying Ozempic.  Hopefully this will be covered, and might need prior authorization.  Counseled on side effects including swelling of the neck/cancer of the thyroid, vision changes and pancreatitis/severe abdominal pain.  These are rare side effects, but any concern should lead to immediate contact.  More common side effects are nausea, vomiting, abdominal discomfort, early satiety, decreased appetite, loose bowels, diarrhea or constipation.  We will start at the lowest dose of 0.25 mg, potentially increase after 4 weeks.  Continue to follow-up visit here in about 1 month to evaluate.  Patient will go for blood work and urine testing at his convenience.  - POC Glycosylated Hemoglobin (Hb A1C)  - Comprehensive Metabolic Panel; Future  - MicroAlbumin, Urine, Random - Urine, Clean Catch; Future  - Semaglutide,0.25 or 0.5MG/DOS, (OZEMPIC) 2 MG/3ML solution pen-injector; Inject 0.25 mg under the skin into the appropriate area as directed 1 (One) Time Per Week.  Dispense: 3 mL; Refill: 0    3. Essential hypertension  BP Readings from Last 3 Encounters:   01/14/25 146/86   10/23/24 130/68   10/07/24 147/78   Unfortunately he had a lapse in treatment with losartan.  This might explain the blood pressure elevated today.  Continue on losartan.  I have asked him to reach out to the office if he is out of any medication, to prevent any labs.  Follow-up in 1 month.    4. CLL (chronic lymphocytic leukemia)  Doing well, no B symptoms.  Follow-up with heme-onc.    5. Mixed hyperlipidemia  The 10-year ASCVD risk score (Lars FERRIS, et al., 2019) is: 26.4%    Values used to calculate the score:      Age: 59 years      Sex: Male      Is Non- : No      Diabetic: Yes      Tobacco smoker: No      Systolic Blood Pressure:  146 mmHg      Is BP treated: Yes      HDL Cholesterol: 37 mg/dL      Total Cholesterol: 220 mg/dL  Patient is due for recheck of his lipids.  Consider statin due to cardiovascular risk.  Will discuss on next visit after labs.  - Lipid Panel; Future    6. Encounter for screening prostate specific antigen (PSA) measurement  Patient is interested in continued screening for prostate cancer.  - PSA Screen; Future    7. Personal history of tobacco use, presenting hazards to health  Will continue LDCT lung cancer screening.  - CT chest low dose wo; Future    8. History of colon polyps  9. Screen for colon cancer  Patient due for repeat 3-year colonoscopy in April.  - Ambulatory Referral For Screening Colonoscopy    10. Obesity, Class I, BMI 30-34.9         Return in about 5 weeks (around 2/18/2025) for Recheck.    Future Appointments         Provider Department Center    1/27/2025 2:30 PM Evan Powell MD [facility] NEUROLOGY [facility]    1/27/2025 3:30 PM Annalise Sood DNP, APRN [facility] NEUROLOGY [facility]    2/3/2025 10:45 AM [facility] MRI 1 [facility] MRI AT [facility]    2/18/2025 1:15 PM Cj Bermudez MD [facility] INTERNAL MEDICINE [facility]              Cj Bermudez MD  Family Medicine  01/14/2025

## 2025-01-14 NOTE — PROGRESS NOTES
CHIEF COMPLAINT: CLL    Problem List:  Oncology/Hematology History Overview Note   1. CLL stage 0  2.  Reflux  3.  Hypertension  4.  Hyperlipidemia  5.  Type 2 diabetes  6.  Elevated ALT with fatty liver on CT  7.  Colon polyps  8.  Granulomatous calcifications in the spleen on CT    Hematology history timeline:  -2/19/2016 CT angiogram chest [facility] states lymph nodes were normal.  Multiple small pulmonary nodules mostly in the upper lung zone 4-5 mm with recommended follow-up in 6-9 months CT abdomen pelvis showed no adenopathy or splenomegaly.  Spleen was described as normal  -3/16/2022 white count 13,650 hemoglobin 14.7 platelets 209,000.  Absolute lymphocyte count 9960.  CT angiogram chest shows mild bilateral axillary adenopathy right axilla 1.8 x 1.3 cm node, left axilla 2.6 x 2 cm node.  No pulmonary embolus.  Doppler venous imaging negative.  -1/22/2024 CT chest low-dose cancer screening compared to 3/16/2022 shows medial left apical lung scarring.  No mediastinal mass.  Right epicardial lymph node increased from 5 mm now 10 mm.  A couple of right lower epicardial nodes largest previously 12 mm now 14 mm.  Smaller more rounded nodule previously 8 mm now 11 mm.  Axillary adenopathy gradually enlarged.  Right sided node 14 x 18 mm previously now 17 x 21 mm.  Diminished or absent hilar fat suggesting reactive or infiltrative nodes.  Diffuse fatty liver change.  Some shotty retroperitoneal nodes may be present.  Bones normal.  Lung RADS 1 for lung cancer but could have low-grade lymphoproliferative adenopathy.  -1/29/2024 white count 24,500 hemoglobin 15.9 platelets 205,000.  Absolute lymphocyte count 17,930.  Sedimentation rate 7.  LDH normal 197.  Glucose 117 BUN 25 bicarb 21.7 ALT 42 otherwise unremarkable CMP.  ACE level normal 17.    -2/16/2024 [facility] hematology consult: Patient has asymptomatic slowly enlarging adenopathy on screening CT of the chest for lung cancer lung RADS 1.  With  this and has rising lymphocyte count as documented above.  No anemia or thrombocytopenia.  No effusions.  No splenomegaly.  No frequent infections or bed drenching night sweats or unexplained fevers or chills.  May well be CLL but we will get his integrated oncology testing performed and I will see him back but even if we confirm this I will not treat him unless he reaches the above criteria.  I will see him back in a few weeks to go over all this.    -2/16/2024 White count 23,880 with hemoglobin 15 platelets 196,000 with absolute lymphocyte count 17,220.  He met a pathology review shows lymphocytosis and 72% of white blood cells with abnormal CD5 clone consistent with CLL.  Hemoglobin 15 platelets 198,000.  Flow cytometry shows abnormal CD5 B-cell population 50% of leukocytes consistent with chronic lymphocytic leukemia/small lymphocytic lymphoma immunophenotype.  CLL FISH panel showed deletion 13 q. but no other genetic aberrancy.  Immunoglobulin variable heavy chain sequencing did not yield interpretable results  ZAP70 positive/CD38 negative/CD49 negative.  P53 negative DNA sequencing.  Clonal immunoglobulin heavy chain and kappa light chain gene rearrangement detected as well as clonal T-cell receptor beta gene rearrangement detected.  No BCL1 or Bcl-2 gene rearrangement.  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 loss of heterozygosity and gain of chromosome 21.  Patients with 13 q. deletion is still normally have longer survival compared to other prognostic groups.  By allelic loss which he has does not confer any additional prognostic information and is the same as mono allelic loss.    -3/15/2024 [facility] hematology consult: The above data indicates he has stage 0 CLL with fairly good prognostic features.  For now there is no indication for treatment such as…  Hemoglobin less than 10 not hemolytic  Platelets less than  "100,000 not ITP  10 cm or greater nodes or symptomatic nodes  Splenomegaly  Unexplained B symptoms  Frequent infections  Symptomatic effusions  For now we will go with watchful waiting and he will see my nurse practitioner every 3 months for the next year to see with the jerrica of this is.  If he has absolute lymphocyte count doubling time less than 6 months that can be a soft indication for institution of therapy.    -7/15/2024 [facility] Hematology/Oncology clinic follow-up:  Elan is doing well, CBC overall is stable.  White count is a little higher at 30,850 with absolute lymphocytes 26.19, CBC is otherwise normal, no anemia hemoglobin 14.1, hematocrit 42.6%, platelet count is normal at 190,000.  He has no palpable lymphadenopathy.  He has no \"B symptoms\".  We will continue surveillance with repeat CBC in 3 months and I have ordered that today.     -10/7/2024 [facility] hematology/oncology clinic follow-up: CBC WBC 33.47, hemoglobin 14.6, hematocrit 43.4%, platelet count 189,000, absolute lymphocytes 28.67.  Repeat in 3 months.     CLL (chronic lymphocytic leukemia)   3/15/2024 Initial Diagnosis    CLL (chronic lymphocytic leukemia)         HISTORY OF PRESENT ILLNESS:  The patient is a 59 y.o. male, here for follow up on management of CLL.  Elan has been doing well since we saw him last with no new concerns.  He has had no illnesses, no fevers or chills.  No lymphadenopathy that he is aware of.  He is working on trying to eat a healthier diet and try to lose some weight otherwise he has had no change in his appetite, no abdominal pain.  No change in his bowel or bladder habits.    Past Medical History:   Diagnosis Date    Anxiety     Arthritis     Cancer Feb 2024p    CLL    Essential hypertension 03/18/2022    JACK (generalized anxiety disorder) 03/18/2022    Gastroesophageal reflux disease without esophagitis 11/20/2023    History of COVID-19 03/18/2022    Mixed hyperlipidemia 11/21/2023    Primary osteoarthritis of " "both shoulders 11/22/2023    Type 2 diabetes mellitus without complication, without long-term current use of insulin 11/21/2023 11/2023 A1c 6.7     No past surgical history on file.    No Known Allergies    Family History and Social History reviewed and changed as necessary    REVIEW OF SYSTEM:   No new somatic concerns    PHYSICAL EXAM:  Well-developed, well-nourished healthy appearing male in no distress  No cervical, supraclavicular or axillary nodes palpable on exam  Respirations regular and unlabored    Vitals:    01/14/25 1511   BP: 150/96   Pulse: 76   Resp: 20   Temp: 97.8 °F (36.6 °C)   TempSrc: Temporal   SpO2: 96%   Weight: 99.8 kg (220 lb)   Height: 177.8 cm (70\")     Vitals:    01/14/25 1511   PainSc: 0-No pain          ECOG score: 0           Vitals reviewed.  Labs reviewed    ECOG: (0) Fully Active - Able to Carry On All Pre-disease Performance Without Restriction    Lab Results   Component Value Date    HGB 15.1 01/14/2025    HCT 44.9 01/14/2025    MCV 90.0 01/14/2025     01/14/2025    WBC 29.08 (H) 01/14/2025    NEUTROABS 4.02 01/14/2025    LYMPHSABS 24.09 (H) 01/14/2025    MONOSABS 0.70 01/14/2025    EOSABS 0.20 01/14/2025    BASOSABS 0.04 01/14/2025       Lab Results   Component Value Date    GLUCOSE 91 02/16/2024    BUN 24 (H) 02/16/2024    CREATININE 1.01 02/16/2024     02/16/2024    K 4.4 02/16/2024     02/16/2024    CO2 24.0 02/16/2024    CALCIUM 9.3 02/16/2024    PROTEINTOT 7.2 02/16/2024    ALBUMIN 4.6 02/16/2024    BILITOT 0.3 02/16/2024    ALKPHOS 56 02/16/2024    AST 21 02/16/2024    ALT 43 (H) 02/16/2024             ASSESSMENT & PLAN:    1.  CLL stage 0: Elan is doing well.  CBC is stable, he has no signs of progressive CLL. WBC is 29.08, hemoglobin 15.1, hematocrit 44.9%, platelet count normal at 207,000, absolute lymphocytes 24.09 with ANC of 4.02.  He has no lymphadenopathy.  No frequent illnesses or B symptoms.  We will continue surveillance with CBC again in 6 " months on return.    I spent 21 minutes caring for Elan on this date of service. This time includes time spent by me in the following activities: preparing for the visit, reviewing tests, performing a medically appropriate examination and/or evaluation, ordering medications, tests, or procedures, documenting information in the medical record, and independently interpreting results and communicating that information with the patient/family/caregiver.     Karuna Goodwin, APRN    01/14/2025

## 2025-01-15 ENCOUNTER — PATIENT MESSAGE (OUTPATIENT)
Dept: INTERNAL MEDICINE | Facility: CLINIC | Age: 60
End: 2025-01-15
Payer: COMMERCIAL

## 2025-01-15 DIAGNOSIS — E78.2 MIXED HYPERLIPIDEMIA: Primary | ICD-10-CM

## 2025-01-15 LAB — PSA SERPL-MCNC: 0.26 NG/ML (ref 0–4)

## 2025-01-15 RX ORDER — ROSUVASTATIN CALCIUM 20 MG/1
20 TABLET, COATED ORAL DAILY
Qty: 90 TABLET | Refills: 0 | Status: SHIPPED | OUTPATIENT
Start: 2025-01-15

## 2025-01-27 ENCOUNTER — PROCEDURE VISIT (OUTPATIENT)
Dept: NEUROLOGY | Facility: CLINIC | Age: 60
End: 2025-01-27
Payer: COMMERCIAL

## 2025-01-27 ENCOUNTER — OFFICE VISIT (OUTPATIENT)
Dept: NEUROLOGY | Facility: CLINIC | Age: 60
End: 2025-01-27
Payer: COMMERCIAL

## 2025-01-27 ENCOUNTER — TELEPHONE (OUTPATIENT)
Dept: NEUROLOGY | Facility: CLINIC | Age: 60
End: 2025-01-27

## 2025-01-27 VITALS
OXYGEN SATURATION: 97 % | WEIGHT: 223.6 LBS | HEIGHT: 70 IN | DIASTOLIC BLOOD PRESSURE: 86 MMHG | BODY MASS INDEX: 32.01 KG/M2 | SYSTOLIC BLOOD PRESSURE: 142 MMHG | HEART RATE: 84 BPM

## 2025-01-27 DIAGNOSIS — G56.01 CARPAL TUNNEL SYNDROME OF RIGHT WRIST: ICD-10-CM

## 2025-01-27 DIAGNOSIS — G56.01 CARPAL TUNNEL SYNDROME OF RIGHT WRIST: Primary | ICD-10-CM

## 2025-01-27 DIAGNOSIS — R20.2 NUMBNESS AND TINGLING: ICD-10-CM

## 2025-01-27 DIAGNOSIS — R20.0 NUMBNESS AND TINGLING: Primary | ICD-10-CM

## 2025-01-27 DIAGNOSIS — R73.03 PREDIABETES: ICD-10-CM

## 2025-01-27 DIAGNOSIS — R20.2 NUMBNESS AND TINGLING: Primary | ICD-10-CM

## 2025-01-27 DIAGNOSIS — R20.0 NUMBNESS AND TINGLING: ICD-10-CM

## 2025-01-27 PROCEDURE — 95886 MUSC TEST DONE W/N TEST COMP: CPT | Performed by: PSYCHIATRY & NEUROLOGY

## 2025-01-27 PROCEDURE — 95911 NRV CNDJ TEST 9-10 STUDIES: CPT | Performed by: PSYCHIATRY & NEUROLOGY

## 2025-01-27 PROCEDURE — 99213 OFFICE O/P EST LOW 20 MIN: CPT | Performed by: NURSE PRACTITIONER

## 2025-01-27 NOTE — TELEPHONE ENCOUNTER
Patient is here in office to get results.    ----- Message from Annalise Sood sent at 1/27/2025  3:26 PM EST -----        ----- Message -----  From: Evan Powell MD  Sent: 1/27/2025   3:02 PM EST  To: Annalise Sood DNP, APRN

## 2025-01-27 NOTE — PROGRESS NOTES
Please notify pt EMG showed carpal tunnel on right wrist. We recommend she wear a cock up splint at night for 6 months. She can get this from a drugstore.

## 2025-01-27 NOTE — PROGRESS NOTES
[facility]   Electrodiagnostic Laboratory    Nerve Conduction & EMG Report        Patient: Elan Freed   Patient ID: [id number]   YOB: 1965  Sex: male      Exam Physician:  Evan Powell MD  Refer Physician:  RAISA Garrett*    Electromyogram and Nerve Conduction Velocity Procedure Note    Hx: 59 y.o. right handed male with complaint of numbness involving the the upper and lower extremities. Symptoms have been present for several months and were provoked by worse at night. Significant past medical history includes diabetes mellitus.  Family history no family history of nerve or muscle disease.    Exam: Motor power is normal. There is no atrophy. There are no fasciculations. Deep tendon reflexes are present and symmetrical. Sensory exam is normal.      Edx studies of the R UE and LE were performed to evaluate for peripheral neuropathy.      NCS Examination   For sensory nerve conduction studies, the amplitude is measured peak-to-peak, the latency reported is the distal peak latency, and the conduction velocity, if measured, is determined from onset latencies and is over the forearm.   For motor nerve conduction studies, the amplitude is measured baseline-to-peak, the latency reported is the distal onset latency, the conduction velocity is calculated over the forearm, and the F wave latency is the minimum latency.   Unless otherwise noted, the hand temperature was monitored continuously and remained between 32°C and 36°C during the performance of the NCSs.     Nerve Conduction Studies  Anti Sensory Summary Table     Stim Site NR Norm Peak (ms) O-P Amp (µV) Norm O-P Amp Onset (ms) Site1 Site2 Delta-0 (ms) Dist (cm) Sergio (m/s) Norm Sergio (m/s)   Right Median Anti Sensory (2nd Digit)   Wrist    <3.6 16.0 >10 3.3 Wrist 2nd Digit 3.3 14.0 42    Right Radial Anti Sensory (Base 1st Digit)   Wrist    <3.1 4.8  1.8 Wrist Base 1st Digit 1.8 0.0     Right Sural Anti Sensory (Lat Mall)    Calf    <4.0 8.1 >5.0 2.3 Calf Lat Mall 2.3 14.0 61    Right Ulnar Anti Sensory (5th Digit)   Wrist    <3.7 19.1 >15.0 2.4 Wrist 5th Digit 2.4 0.0       Motor Summary Table     Stim Site NR Onset (ms) Norm Onset (ms) O-P Amp (mV) Norm O-P Amp Site1 Site2 Delta-0 (ms) Dist (cm) Sergio (m/s) Norm Sergio (m/s)   Right Fibular Motor (Ext Dig Brev)   Ankle    4.8 <6.1 4.5 >2.5 B Fib Ankle 7.5 36.0 48 >40   B Fib    12.3  4.4  Poplt B Fib 1.5 10.0 67 >40   Poplt    13.8  4.4          Right Median Motor (Abd Poll Brev)   Wrist    *4.7 <4.2 5.0 >5 Elbow Wrist 4.1 23.0 56 >50   Elbow    8.8  4.8          Right Tibial Motor (Abd Nails Brev)   Ankle    4.8 <6.1 7.5 >3.0 Knee Ankle 10.0 42.0 42 >40   Knee    14.8  3.4          Right Ulnar Motor (Abd Dig Minimi)   Wrist    2.4 <4.2 6.7 >3 B Elbow Wrist 3.9 21.0 54 >53   B Elbow    6.3  6.4  A Elbow B Elbow 1.7 11.0 65 >53   A Elbow    8.0  5.1            F Wave Studies     NR F-Lat (ms) Lat Norm (ms) L-R F-Lat (ms) L-R Lat Norm   Right Fibular (Mrkrs) (EDB)      57.81 <60  <5.1   Right Median (Mrkrs) (Abd Poll Brev)      30.86 <33  <2.2   Right Tibial (Mrkrs) (Abd Hallucis)      56.37 <61  <5.7   Right Ulnar (Mrkrs) (Abd Dig Min)      31.88 <36  <2.5     H Reflex Studies     NR H-Lat (ms) L-R H-Lat (ms) L-R Lat Norm   Right Tibial (Mrkrs) (Gastroc)      34.22  <2.0     EMG Examination   The study was performed with a concentric needle electrode. Fibrillation and fasciculation activity is graded from none (0) to continuous (4+). The configuration and recruitment pattern of motor unit action potentials under voluntary control, if not normal, are described below        Side Muscle Nerve Root Ins Act Fibs Psw Amp Dur Poly Recrt Int Pat Comment   Right AntTibialis Dp Br Fibular L4-5 Nml Nml Nml Nml Nml 0 Nml Nml    Right Gastroc Tibial S1-2 Nml Nml Nml Nml Nml 0 Nml Nml    Right BicepsFemL Sciatic L5-S2 Nml Nml Nml Nml Nml 0 Nml Nml    Right VastusMed Femoral L2-4 Nml Nml Nml Nml Nml 0  Nml Nml    Right Iliopsoas Femoral L2-3 Nml Nml Nml Nml Nml 0 Nml Nml    Right Deltoid Axillary C5-6 Nml Nml Nml Nml Nml 0 Nml Nml    Right Triceps Radial C6-7-8 Nml Nml Nml Nml Nml 0 Nml Nml    Right Biceps Musculocut C5-6 Nml Nml Nml Nml Nml 0 Nml Nml    Right PronatorTeres Median C6-7 Nml Nml Nml Nml Nml 0 Nml Nml    Right 1stDorInt Ulnar C8-T1 Nml Nml Nml Nml Nml 0 Nml Nml             NCV FINDINGS:  Evaluation of the right median motor nerve showed prolonged distal onset latency (4.7 ms).  The right median sensory nerve showed prolonged distal peak latency (4.1 ms).  All remaining nerves (as indicated in the following tables) were within normal limits.  All F Wave latencies were within normal limits.      EMG FINDINGS:  All examined muscles (as indicated in the following table) showed no evidence of electrical instability.    Conclusion: This study showed neurophysiologic evidence of a median mononeuropathy of the right wrists. This would be consistent with the clinical diagnosis of carpal tunnel syndrome, mild.           Instrument used:  Teca Synergy        Performed by:          Evan Powell MD
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
## 2025-02-03 ENCOUNTER — HOSPITAL ENCOUNTER (OUTPATIENT)
Dept: MRI IMAGING | Facility: HOSPITAL | Age: 60
Discharge: HOME | End: 2025-02-03
Payer: COMMERCIAL

## 2025-02-03 ENCOUNTER — HOSPITAL ENCOUNTER (OUTPATIENT)
Dept: CT IMAGING | Facility: HOSPITAL | Age: 60
Discharge: HOME | End: 2025-02-03
Payer: COMMERCIAL

## 2025-02-03 DIAGNOSIS — Z87.891 PERSONAL HISTORY OF TOBACCO USE, PRESENTING HAZARDS TO HEALTH: ICD-10-CM

## 2025-02-03 DIAGNOSIS — R90.82 WHITE MATTER DISEASE: ICD-10-CM

## 2025-02-03 DIAGNOSIS — R20.2 NUMBNESS AND TINGLING: Primary | ICD-10-CM

## 2025-02-03 DIAGNOSIS — R20.0 NUMBNESS AND TINGLING: ICD-10-CM

## 2025-02-03 DIAGNOSIS — R20.0 NUMBNESS AND TINGLING: Primary | ICD-10-CM

## 2025-02-03 DIAGNOSIS — R20.2 NUMBNESS AND TINGLING: ICD-10-CM

## 2025-02-03 PROCEDURE — 25510000002 GADOBENATE DIMEGLUMINE 529 MG/ML SOLUTION: Performed by: NURSE PRACTITIONER

## 2025-02-03 PROCEDURE — 71271 CT THORAX LUNG CANCER SCR C-: CPT

## 2025-02-03 PROCEDURE — 70553 MRI BRAIN STEM W/O & W/DYE: CPT

## 2025-02-03 PROCEDURE — A9577 INJ MULTIHANCE: HCPCS | Performed by: NURSE PRACTITIONER

## 2025-02-03 RX ADMIN — GADOBENATE DIMEGLUMINE 20 ML: 529 INJECTION, SOLUTION INTRAVENOUS at 11:29

## 2025-02-11 ENCOUNTER — TELEPHONE (OUTPATIENT)
Dept: NEUROLOGY | Facility: CLINIC | Age: 60
End: 2025-02-11
Payer: COMMERCIAL

## 2025-02-11 NOTE — TELEPHONE ENCOUNTER
I called pt back and answered his questions. He has full body numbness. EMG consistent with CTS. MRI brain with multiple WM lesions. LP to rule out MS.

## 2025-02-18 ENCOUNTER — PATIENT MESSAGE (OUTPATIENT)
Dept: INTERNAL MEDICINE | Facility: CLINIC | Age: 60
End: 2025-02-18

## 2025-02-18 ENCOUNTER — OFFICE VISIT (OUTPATIENT)
Dept: INTERNAL MEDICINE | Facility: CLINIC | Age: 60
End: 2025-02-18
Payer: COMMERCIAL

## 2025-02-18 VITALS
SYSTOLIC BLOOD PRESSURE: 145 MMHG | HEART RATE: 90 BPM | DIASTOLIC BLOOD PRESSURE: 84 MMHG | BODY MASS INDEX: 30.21 KG/M2 | WEIGHT: 211 LBS | TEMPERATURE: 97.8 F | HEIGHT: 70 IN | OXYGEN SATURATION: 96 %

## 2025-02-18 DIAGNOSIS — I10 ESSENTIAL HYPERTENSION: Primary | Chronic | ICD-10-CM

## 2025-02-18 DIAGNOSIS — E11.9 TYPE 2 DIABETES MELLITUS WITHOUT COMPLICATION, WITHOUT LONG-TERM CURRENT USE OF INSULIN: Chronic | ICD-10-CM

## 2025-02-18 DIAGNOSIS — R90.89 ABNORMAL FINDING ON MRI OF BRAIN: ICD-10-CM

## 2025-02-18 DIAGNOSIS — R31.29 MICROSCOPIC HEMATURIA: ICD-10-CM

## 2025-02-18 DIAGNOSIS — E78.2 MIXED HYPERLIPIDEMIA: Chronic | ICD-10-CM

## 2025-02-18 DIAGNOSIS — R35.0 URINARY FREQUENCY: ICD-10-CM

## 2025-02-18 DIAGNOSIS — I10 ESSENTIAL HYPERTENSION: Chronic | ICD-10-CM

## 2025-02-18 LAB
BILIRUB BLD-MCNC: NEGATIVE MG/DL
CLARITY, POC: CLEAR
COLOR UR: YELLOW
EXPIRATION DATE: ABNORMAL
EXPIRATION DATE: NORMAL
GLUCOSE UR STRIP-MCNC: NEGATIVE MG/DL
KETONES UR QL: ABNORMAL
LEUKOCYTE EST, POC: NEGATIVE
Lab: ABNORMAL
Lab: NORMAL
NITRITE UR-MCNC: NEGATIVE MG/ML
PH UR: 5.5 [PH] (ref 5–8)
POC ALBUMIN, URINE: 30 MG/L
POC CREATININE, URINE: 300 MG/DL
POC URINE ALB/CREA RATIO: NORMAL
PROT UR STRIP-MCNC: NEGATIVE MG/DL
RBC # UR STRIP: ABNORMAL /UL
SP GR UR: 1.02 (ref 1–1.03)
UROBILINOGEN UR QL: ABNORMAL

## 2025-02-18 PROCEDURE — 81003 URINALYSIS AUTO W/O SCOPE: CPT | Performed by: STUDENT IN AN ORGANIZED HEALTH CARE EDUCATION/TRAINING PROGRAM

## 2025-02-18 PROCEDURE — 99214 OFFICE O/P EST MOD 30 MIN: CPT | Performed by: STUDENT IN AN ORGANIZED HEALTH CARE EDUCATION/TRAINING PROGRAM

## 2025-02-18 PROCEDURE — 82570 ASSAY OF URINE CREATININE: CPT | Performed by: STUDENT IN AN ORGANIZED HEALTH CARE EDUCATION/TRAINING PROGRAM

## 2025-02-18 PROCEDURE — 82044 UR ALBUMIN SEMIQUANTITATIVE: CPT | Performed by: STUDENT IN AN ORGANIZED HEALTH CARE EDUCATION/TRAINING PROGRAM

## 2025-02-18 RX ORDER — LOSARTAN POTASSIUM 50 MG/1
50 TABLET ORAL DAILY
Qty: 30 TABLET | Refills: 3 | Status: SHIPPED | OUTPATIENT
Start: 2025-02-18 | End: 2025-02-19 | Stop reason: SDUPTHER

## 2025-02-18 RX ORDER — ERGOCALCIFEROL 1.25 MG/1
50000 CAPSULE, LIQUID FILLED ORAL DAILY
Qty: 5 CAPSULE | Refills: 0 | Status: SHIPPED | OUTPATIENT
Start: 2025-02-18

## 2025-02-18 NOTE — TELEPHONE ENCOUNTER
Rx Refill Note  Requested Prescriptions     Pending Prescriptions Disp Refills    vitamin D (ERGOCALCIFEROL) 1.25 MG (30116 UT) capsule capsule 5 capsule      Sig: Take 1 capsule by mouth Daily.      Last office visit with prescribing clinician: 1/14/2025   Last telemedicine visit with prescribing clinician: Visit date not found   Next office visit with prescribing clinician: 2/18/2025                         Would you like a call back once the refill request has been completed: [] Yes [] No    If the office needs to give you a call back, can they leave a voicemail: [] Yes [] No    Irene Boateng MA  02/18/25, 08:20 EST

## 2025-02-18 NOTE — PROGRESS NOTES
"Chief Complaint  Elan Freed is a 59 y.o. male presenting for Type 2 diabetes mellitus  (F/u).     From [place]. Lives with wife. Has son born 1992. Has own business - building swimming pools since 1987. Twin brother passed unexpectedly May 2022.     Patient has a past medical history of CLL (3/2024, [facility], f/u dr. Mcdonough), hypertension, T2DM (11/2023), JACK, GERD and uncomplicated COVID-19 (3/16/22).    History of Present Illness  Patient is here for follow-up.    He tells me he has been really stressed out recently.  He did MRI of his brain showing changes that could be consistent with MRI versus microvascular changes versus vasculitis.  He is planned for lumbar puncture next week.  He is worried about this.  He has never had any episode of weakness, but he did have numbness and tingling, that now has resolved.    He does keep an eye on his home blood pressures, typically ranging 140/95.    He is doing well on Ozempic.  He is taking 0.25 mg and has tolerated well.  He feels he can stay on the same dose now and is losing weight.    The following portions of the patient's history were reviewed and updated as appropriate: allergies, current medications, past family history, past medical history, past social history, past surgical history, and problem list.    Objective  /84 (BP Location: Right arm, Patient Position: Sitting, Cuff Size: Large Adult)   Pulse 90   Temp 97.8 °F (36.6 °C) (Infrared)   Ht 177.8 cm (70\")   Wt 95.7 kg (211 lb)   SpO2 96%   BMI 30.28 kg/m²     Physical Exam  Constitutional:       Appearance: Normal appearance.   HENT:      Head: Normocephalic and atraumatic.   Eyes:      Extraocular Movements: Extraocular movements intact.      Conjunctiva/sclera: Conjunctivae normal.   Pulmonary:      Effort: Pulmonary effort is normal. No respiratory distress.   Musculoskeletal:      Cervical back: Neck supple.   Skin:     General: Skin is warm and dry.   Neurological:      Mental Status: He " is alert and oriented to person, place, and time. Mental status is at baseline.   Psychiatric:         Mood and Affect: Mood is anxious. Mood is not depressed.         Behavior: Behavior normal.         Thought Content: Thought content normal.         Assessment/Plan   1. Essential hypertension  BP Readings from Last 3 Encounters:   02/18/25 145/84   01/27/25 142/86   01/14/25 150/96   Blood pressure still not at goal.  Recommend increasing losartan from 25 mg to 50 mg.  Follow-up in about 2 months.  - losartan (COZAAR) 50 MG tablet; Take 1 tablet by mouth Daily.  Dispense: 30 tablet; Refill: 3    2. Abnormal finding on MRI of brain  Discussed findings.  This could be incidental, but worst-case could be MS.  Follow-up with neurology and plan for spinal tap.    3. Type 2 diabetes mellitus without complication, without long-term current use of insulin  Hemoglobin A1C   Date Value Ref Range Status   01/14/2025 6.2 (A) 4.5 - 5.7 % Final   07/23/2024 6.2 (A) 4.5 - 5.7 % Final   04/23/2024 6.4 (A) 4.5 - 5.7 % Final   01/29/2024 6.70 (H) 4.80 - 5.60 % Final   11/20/2023 6.70 (H) 4.80 - 5.60 % Final   Diabetes well-controlled.  Tolerating Ozempic.  Continue on current dose.  - Albumin/Creatinine Ratio Urine  - Hemoglobin A1c; Future  - Semaglutide,0.25 or 0.5MG/DOS, (OZEMPIC) 2 MG/3ML solution pen-injector; Inject 0.25 mg under the skin into the appropriate area as directed 1 (One) Time Per Week.  Dispense: 3 mL; Refill: 0    4. Urinary frequency  5. Microscopic hematuria  Patient reporting urinary frequency.  UA shows microscopic hematuria and ketones.  Recommend recheck in 2-4 weeks, sooner if any worsening symptoms, and if any worsening symptoms I would do a urine culture.  - POCT urinalysis dipstick, automated  - Urine Culture - Urine, Urine, Clean Catch; Future  - Urinalysis With Microscopic If Indicated (No Culture) - Urine, Clean Catch; Future    6. Mixed hyperlipidemia  Recheck blood work before next visit.   Continue on rosuvastatin 20 mg.  - Lipid Panel; Future  - Comprehensive Metabolic Panel; Future          Return in about 8 weeks (around 4/15/2025) for Recheck.    Future Appointments           Provider Department Center     2/20/2025 12:00 PM [facility]; [facility] [place] [facility] [place] [facility]     4/15/2025 2:30 PM Cj Bermudez MD [facility] INTERNAL MEDICINE [facility]     4/29/2025 12:00 PM (Arrive by 11:30 AM) Mendoza Dye MD [facility] GASTROENTEROLOGY [facility]     5/6/2025 1:00 PM Annalise Sood, AURELIANO, APRN [facility] NEUROLOGY [facility]     7/14/2025 1:00 PM (Arrive by 12:45 PM) Son Mcdonough MD [facility] HEMATOLOGY & ONCOLOGY [facility]              Cj Bermudez MD  Family Medicine  02/18/2025

## 2025-02-19 ENCOUNTER — TELEPHONE (OUTPATIENT)
Dept: INFUSION THERAPY | Facility: HOSPITAL | Age: 60
End: 2025-02-19
Payer: COMMERCIAL

## 2025-02-19 RX ORDER — LOSARTAN POTASSIUM 100 MG/1
100 TABLET ORAL DAILY
Qty: 30 TABLET | Refills: 3 | Status: SHIPPED | OUTPATIENT
Start: 2025-02-19

## 2025-02-19 NOTE — TELEPHONE ENCOUNTER
Patient contacted as pre-procedure call prior to planned lumbar puncture scheduled for 2/20/25 @ 1200. Reviewed with patient arrival time to main registration,  needed, may have a light breakfast prior to 0700 but clear liquids okay up until time for procedure,  plan on being here for procedure 4-5 hours so wear comfortable clothes.     Patient thought his appointment was on Friday 2/21/25. Telephone numbers provided in case he will needed to reschedule or have any other questions.

## 2025-02-20 ENCOUNTER — HOSPITAL ENCOUNTER (OUTPATIENT)
Dept: GENERAL RADIOLOGY | Facility: HOSPITAL | Age: 60
Discharge: HOME | End: 2025-02-20
Payer: COMMERCIAL

## 2025-02-20 VITALS
HEIGHT: 70 IN | DIASTOLIC BLOOD PRESSURE: 77 MMHG | HEART RATE: 88 BPM | SYSTOLIC BLOOD PRESSURE: 120 MMHG | WEIGHT: 211 LBS | TEMPERATURE: 97.6 F | OXYGEN SATURATION: 95 % | BODY MASS INDEX: 30.21 KG/M2 | RESPIRATION RATE: 18 BRPM

## 2025-02-20 DIAGNOSIS — R20.2 NUMBNESS AND TINGLING: ICD-10-CM

## 2025-02-20 DIAGNOSIS — R20.0 NUMBNESS AND TINGLING: ICD-10-CM

## 2025-02-20 DIAGNOSIS — R90.82 WHITE MATTER DISEASE: ICD-10-CM

## 2025-02-20 LAB
APPEARANCE CSF: CLEAR
APPEARANCE CSF: CLEAR
COLOR CSF: COLORLESS
COLOR CSF: COLORLESS
GLUCOSE BLDC GLUCOMTR-MCNC: 100 MG/DL (ref 70–130)
GLUCOSE CSF-MCNC: 57 MG/DL (ref 40–70)
PROT CSF-MCNC: 68.7 MG/DL (ref 15–45)
RBC # CSF MANUAL: 0 /MM3 (ref 0–5)
RBC # CSF MANUAL: 0 /MM3 (ref 0–5)
SPECIMEN VOL CSF: 10 ML
SPECIMEN VOL CSF: 10 ML
TUBE # CSF: 1
TUBE # CSF: 3
WBC # CSF MANUAL: 2 /MM3 (ref 0–5)
WBC # CSF MANUAL: 3 /MM3 (ref 0–5)

## 2025-02-20 PROCEDURE — 82784 ASSAY IGA/IGD/IGG/IGM EACH: CPT | Performed by: NURSE PRACTITIONER

## 2025-02-20 PROCEDURE — 83873 ASSAY OF CSF PROTEIN: CPT | Performed by: NURSE PRACTITIONER

## 2025-02-20 PROCEDURE — 87075 CULTR BACTERIA EXCEPT BLOOD: CPT | Performed by: NURSE PRACTITIONER

## 2025-02-20 PROCEDURE — 84157 ASSAY OF PROTEIN OTHER: CPT | Performed by: NURSE PRACTITIONER

## 2025-02-20 PROCEDURE — 87205 SMEAR GRAM STAIN: CPT | Performed by: NURSE PRACTITIONER

## 2025-02-20 PROCEDURE — 82945 GLUCOSE OTHER FLUID: CPT | Performed by: NURSE PRACTITIONER

## 2025-02-20 PROCEDURE — 82948 REAGENT STRIP/BLOOD GLUCOSE: CPT

## 2025-02-20 PROCEDURE — 25010000002 LIDOCAINE 1 % SOLUTION: Performed by: NURSE PRACTITIONER

## 2025-02-20 PROCEDURE — 89050 BODY FLUID CELL COUNT: CPT | Performed by: NURSE PRACTITIONER

## 2025-02-20 PROCEDURE — 87070 CULTURE OTHR SPECIMN AEROBIC: CPT | Performed by: NURSE PRACTITIONER

## 2025-02-20 PROCEDURE — 83916 OLIGOCLONAL BANDS: CPT | Performed by: NURSE PRACTITIONER

## 2025-02-20 PROCEDURE — 87015 SPECIMEN INFECT AGNT CONCNTJ: CPT | Performed by: NURSE PRACTITIONER

## 2025-02-20 PROCEDURE — 82042 OTHER SOURCE ALBUMIN QUAN EA: CPT | Performed by: NURSE PRACTITIONER

## 2025-02-20 PROCEDURE — 82040 ASSAY OF SERUM ALBUMIN: CPT | Performed by: NURSE PRACTITIONER

## 2025-02-20 RX ORDER — LIDOCAINE HYDROCHLORIDE 10 MG/ML
5 INJECTION, SOLUTION INFILTRATION; PERINEURAL ONCE
Status: COMPLETED | OUTPATIENT
Start: 2025-02-20 | End: 2025-02-20

## 2025-02-20 RX ADMIN — LIDOCAINE HYDROCHLORIDE 5 ML: 10 INJECTION, SOLUTION INFILTRATION; PERINEURAL at 13:18

## 2025-02-20 NOTE — POST-PROCEDURE NOTE
Radiology Procedure    Pre-procedure: procedure, risks discussed with patient. Patient indicated understanding and consented to procedure.     Procedure Performed: lumbar puncture     IV Sedation and/or Anesthesia:  No    Complications: none    Preliminary Findings: pending    Specimen Removed: 10cc clear, colorless CSF    Estimated Blood Loss:  0ml    Post-Procedure Diagnosis: pending    Post-Procedure Plan: encourage fluids, bed rest x 2 hours    Standard Discharge Instructions Given:yes     GLORIA Nobles  02/20/25  13:32 EST

## 2025-02-20 NOTE — PROGRESS NOTES
[facility]     Progress Note    Patient Name: Elan Freed  : 1965  MRN: [id number]  Primary Care Physician:  Cj Bermudez MD  Date of admission: 2025    Subjective   Subjective     Chief Complaint: ***    History of Present Illness  Patient Reports ***    Review of Systems    Objective   Objective     Vitals:   Temp:  [97.6 °F (36.4 °C)] 97.6 °F (36.4 °C)  Heart Rate:  [93] 93  Resp:  [18] 18  BP: (132)/(90) 132/90    Physical Exam     Result Review    [unfilled]    Assessment & Plan   Assessment / Plan     Brief Patient Summary:  Elan Freed is a 59 y.o. male who ***    Active Hospital Problems:  There are no active hospital problems to display for this patient.    Plan:       VTE Prophylaxis:  No VTE prophylaxis order currently exists.        CODE STATUS:       Disposition:  I expect patient to be discharged ***.    Erica Lester RN

## 2025-02-21 ENCOUNTER — TELEPHONE (OUTPATIENT)
Dept: INFUSION THERAPY | Facility: HOSPITAL | Age: 60
End: 2025-02-21
Payer: COMMERCIAL

## 2025-02-23 LAB
BACTERIA SPEC AEROBE CULT: NORMAL
BACTERIA SPEC ANAEROBE CULT: NORMAL
GRAM STN SPEC: NORMAL
GRAM STN SPEC: NORMAL

## 2025-02-24 ENCOUNTER — TELEPHONE (OUTPATIENT)
Dept: NEUROLOGY | Facility: CLINIC | Age: 60
End: 2025-02-24
Payer: COMMERCIAL

## 2025-02-24 LAB
ALB CSF/SERPL: 12 {RATIO} (ref 0–8)
ALBUMIN CSF-MCNC: 53 MG/DL (ref 15–55)
ALBUMIN SERPL-MCNC: 4.5 G/DL (ref 3.8–4.9)
IGG CSF-MCNC: 5.4 MG/DL (ref 0–10.3)
IGG SERPL-MCNC: 945 MG/DL (ref 603–1613)
IGG SYNTH RATE SER+CSF CALC-MRATE: -0.9 MG/DAY
IGG/ALB CLEAR SER+CSF-RTO: 0.5 (ref 0–0.7)
IGG/ALB CSF: 0.1 {RATIO} (ref 0–0.25)
MBP CSF-MCNC: 4.8 NG/ML (ref 0–4.7)
OLIGOCLONAL BANDS.IT SER+CSF QL: ABNORMAL
REF LAB TEST METHOD: NORMAL

## 2025-02-24 NOTE — TELEPHONE ENCOUNTER
Called patient and gave results.    ----- Message from Annalise Sood sent at 2/24/2025  4:22 PM EST -----  Please notify pt his protein is elevated likely due to his diabetes. Spinal fluid is negative for malignancy and infections. We are still waiting on several labs to return.

## 2025-02-25 ENCOUNTER — TELEPHONE (OUTPATIENT)
Dept: NEUROLOGY | Facility: CLINIC | Age: 60
End: 2025-02-25
Payer: COMMERCIAL

## 2025-02-25 LAB — BACTERIA SPEC ANAEROBE CULT: NORMAL

## 2025-02-25 NOTE — TELEPHONE ENCOUNTER
"Called patient and could not leave vm as inbox full.    ----- Message from Annalise Sood sent at 2/25/2025 10:15 AM EST -----  Relay     \"Please notify pt his spinal fluid is negative for MS. This is good news.\"                "

## 2025-03-05 ENCOUNTER — PATIENT MESSAGE (OUTPATIENT)
Dept: INTERNAL MEDICINE | Facility: CLINIC | Age: 60
End: 2025-03-05
Payer: COMMERCIAL

## 2025-03-05 DIAGNOSIS — I10 ESSENTIAL HYPERTENSION: Primary | Chronic | ICD-10-CM

## 2025-03-05 DIAGNOSIS — E11.9 TYPE 2 DIABETES MELLITUS WITHOUT COMPLICATION, WITHOUT LONG-TERM CURRENT USE OF INSULIN: ICD-10-CM

## 2025-03-05 DIAGNOSIS — E78.2 MIXED HYPERLIPIDEMIA: ICD-10-CM

## 2025-03-05 DIAGNOSIS — E55.9 VITAMIN D DEFICIENCY: ICD-10-CM

## 2025-03-06 RX ORDER — HYDROCHLOROTHIAZIDE 12.5 MG/1
12.5 CAPSULE ORAL DAILY
Qty: 90 CAPSULE | Refills: 0 | Status: SHIPPED | OUTPATIENT
Start: 2025-03-06

## 2025-03-19 DIAGNOSIS — E11.9 TYPE 2 DIABETES MELLITUS WITHOUT COMPLICATION, WITHOUT LONG-TERM CURRENT USE OF INSULIN: Chronic | ICD-10-CM

## 2025-03-19 RX ORDER — METFORMIN HYDROCHLORIDE 500 MG/1
500 TABLET, EXTENDED RELEASE ORAL
Qty: 90 TABLET | Refills: 0 | Status: SHIPPED | OUTPATIENT
Start: 2025-03-19

## 2025-03-19 NOTE — TELEPHONE ENCOUNTER
Rx Refill Note  Requested Prescriptions     Pending Prescriptions Disp Refills    metFORMIN ER (GLUCOPHAGE-XR) 500 MG 24 hr tablet 90 tablet 0     Sig: Take 1 tablet by mouth Daily With Breakfast.      Last office visit with prescribing clinician: 2/18/2025   Last telemedicine visit with prescribing clinician: Visit date not found   Next office visit with prescribing clinician: 4/15/2025                         Would you like a call back once the refill request has been completed: [] Yes [] No    If the office needs to give you a call back, can they leave a voicemail: [] Yes [] No    Mariposa Nuno MA  03/19/25, 08:29 EDT

## 2025-04-08 ENCOUNTER — LAB (OUTPATIENT)
Dept: LAB | Facility: HOSPITAL | Age: 60
End: 2025-04-08
Payer: COMMERCIAL

## 2025-04-08 DIAGNOSIS — I10 ESSENTIAL HYPERTENSION: Chronic | ICD-10-CM

## 2025-04-08 DIAGNOSIS — R31.29 MICROSCOPIC HEMATURIA: ICD-10-CM

## 2025-04-08 DIAGNOSIS — E78.2 MIXED HYPERLIPIDEMIA: ICD-10-CM

## 2025-04-08 DIAGNOSIS — R35.0 URINARY FREQUENCY: ICD-10-CM

## 2025-04-08 DIAGNOSIS — E11.9 TYPE 2 DIABETES MELLITUS WITHOUT COMPLICATION, WITHOUT LONG-TERM CURRENT USE OF INSULIN: ICD-10-CM

## 2025-04-08 DIAGNOSIS — E55.9 VITAMIN D DEFICIENCY: ICD-10-CM

## 2025-04-08 LAB
25(OH)D3 SERPL-MCNC: 46.5 NG/ML (ref 30–100)
ALBUMIN SERPL-MCNC: 4.6 G/DL (ref 3.5–5.2)
ALBUMIN/GLOB SERPL: 1.8 G/DL
ALP SERPL-CCNC: 59 U/L (ref 39–117)
ALT SERPL W P-5'-P-CCNC: 35 U/L (ref 1–41)
ANION GAP SERPL CALCULATED.3IONS-SCNC: 8.7 MMOL/L (ref 5–15)
AST SERPL-CCNC: 28 U/L (ref 1–40)
BILIRUB SERPL-MCNC: 0.3 MG/DL (ref 0–1.2)
BILIRUB UR QL STRIP: NEGATIVE
BUN SERPL-MCNC: 19 MG/DL (ref 6–20)
BUN/CREAT SERPL: 16.1 (ref 7–25)
CALCIUM SPEC-SCNC: 9.5 MG/DL (ref 8.6–10.5)
CHLORIDE SERPL-SCNC: 105 MMOL/L (ref 98–107)
CHOLEST SERPL-MCNC: 168 MG/DL (ref 0–200)
CLARITY UR: CLEAR
CO2 SERPL-SCNC: 26.3 MMOL/L (ref 22–29)
COLOR UR: YELLOW
CREAT SERPL-MCNC: 1.18 MG/DL (ref 0.76–1.27)
EGFRCR SERPLBLD CKD-EPI 2021: 71.1 ML/MIN/1.73
GLOBULIN UR ELPH-MCNC: 2.5 GM/DL
GLUCOSE SERPL-MCNC: 100 MG/DL (ref 65–99)
GLUCOSE UR STRIP-MCNC: NEGATIVE MG/DL
HBA1C MFR BLD: 5.8 % (ref 4.8–5.6)
HDLC SERPL-MCNC: 38 MG/DL (ref 40–60)
HGB UR QL STRIP.AUTO: NEGATIVE
KETONES UR QL STRIP: NEGATIVE
LDLC SERPL CALC-MCNC: 108 MG/DL (ref 0–100)
LDLC/HDLC SERPL: 2.78 {RATIO}
LEUKOCYTE ESTERASE UR QL STRIP.AUTO: NEGATIVE
NITRITE UR QL STRIP: NEGATIVE
PH UR STRIP.AUTO: 6 [PH] (ref 5–8)
POTASSIUM SERPL-SCNC: 4.2 MMOL/L (ref 3.5–5.2)
PROT SERPL-MCNC: 7.1 G/DL (ref 6–8.5)
PROT UR QL STRIP: NEGATIVE
SODIUM SERPL-SCNC: 140 MMOL/L (ref 136–145)
SP GR UR STRIP: 1.01 (ref 1–1.03)
TRIGL SERPL-MCNC: 121 MG/DL (ref 0–150)
URATE SERPL-MCNC: 4.8 MG/DL (ref 3.4–7)
UROBILINOGEN UR QL STRIP: NORMAL
VLDLC SERPL-MCNC: 22 MG/DL (ref 5–40)

## 2025-04-08 PROCEDURE — 87086 URINE CULTURE/COLONY COUNT: CPT

## 2025-04-08 PROCEDURE — 80061 LIPID PANEL: CPT

## 2025-04-08 PROCEDURE — 80053 COMPREHEN METABOLIC PANEL: CPT

## 2025-04-08 PROCEDURE — 83036 HEMOGLOBIN GLYCOSYLATED A1C: CPT

## 2025-04-08 PROCEDURE — 84550 ASSAY OF BLOOD/URIC ACID: CPT

## 2025-04-08 PROCEDURE — 82306 VITAMIN D 25 HYDROXY: CPT

## 2025-04-08 PROCEDURE — 81003 URINALYSIS AUTO W/O SCOPE: CPT

## 2025-04-09 LAB — BACTERIA SPEC AEROBE CULT: NO GROWTH

## 2025-04-11 DIAGNOSIS — E11.9 TYPE 2 DIABETES MELLITUS WITHOUT COMPLICATION, WITHOUT LONG-TERM CURRENT USE OF INSULIN: Chronic | ICD-10-CM

## 2025-04-13 DIAGNOSIS — F41.1 GAD (GENERALIZED ANXIETY DISORDER): Chronic | ICD-10-CM

## 2025-04-13 DIAGNOSIS — R20.2 NUMBNESS AND TINGLING: ICD-10-CM

## 2025-04-13 DIAGNOSIS — R20.0 NUMBNESS AND TINGLING: ICD-10-CM

## 2025-04-14 RX ORDER — HYDROXYZINE HYDROCHLORIDE 25 MG/1
TABLET, FILM COATED ORAL
Qty: 90 TABLET | Refills: 1 | Status: SHIPPED | OUTPATIENT
Start: 2025-04-14

## 2025-04-14 RX ORDER — MULTIVITAMIN WITH IRON
100 TABLET ORAL DAILY
Qty: 90 TABLET | Refills: 1 | Status: SHIPPED | OUTPATIENT
Start: 2025-04-14

## 2025-04-14 RX ORDER — SEMAGLUTIDE 0.68 MG/ML
INJECTION, SOLUTION SUBCUTANEOUS
Qty: 3 ML | Refills: 0 | Status: SHIPPED | OUTPATIENT
Start: 2025-04-14 | End: 2025-04-15 | Stop reason: SDUPTHER

## 2025-04-14 NOTE — TELEPHONE ENCOUNTER
Rx Refill Note  Requested Prescriptions     Pending Prescriptions Disp Refills    vitamin B-6 (PYRIDOXINE) 100 MG tablet [Pharmacy Med Name: VITAMIN B6 100MG TABLETS 100'S] 90 tablet 1     Sig: TAKE 1 TABLET BY MOUTH EVERY DAY    hydrOXYzine (ATARAX) 25 MG tablet [Pharmacy Med Name: HYDROXYZINE HCL 25MG TABS (WHITE)] 90 tablet 1     Sig: TAKE 1/2 TO 2 TABLETS BY MOUTH AT NIGHT AS NEEDED FOR ANXIETY OR SLEEP      Last office visit with prescribing clinician: 2/18/2025   Last telemedicine visit with prescribing clinician: Visit date not found   Next office visit with prescribing clinician: 4/15/2025                         Would you like a call back once the refill request has been completed: [] Yes [] No    If the office needs to give you a call back, can they leave a voicemail: [] Yes [] No    Mariposa Nuno MA  04/14/25, 08:33 EDT

## 2025-04-14 NOTE — TELEPHONE ENCOUNTER
Rx Refill Note  Requested Prescriptions     Pending Prescriptions Disp Refills    amitriptyline (ELAVIL) 25 MG tablet [Pharmacy Med Name: AMITRIPTYLINE 25MG TABLETS] 30 tablet 5     Sig: TAKE 1 TABLET BY MOUTH EVERY NIGHT      Last filled: 10/23/24 30 with 5 refills.  Last office visit with prescribing clinician: 1/27/2025      Next office visit with prescribing clinician: 5/6/2025     Sent in 30 with 0 refills.    Adalgisa Freitas MA  04/14/25, 08:34 EDT

## 2025-04-15 ENCOUNTER — OFFICE VISIT (OUTPATIENT)
Dept: INTERNAL MEDICINE | Facility: CLINIC | Age: 60
End: 2025-04-15
Payer: COMMERCIAL

## 2025-04-15 VITALS
HEART RATE: 74 BPM | TEMPERATURE: 97.5 F | WEIGHT: 212 LBS | OXYGEN SATURATION: 99 % | DIASTOLIC BLOOD PRESSURE: 74 MMHG | SYSTOLIC BLOOD PRESSURE: 134 MMHG | BODY MASS INDEX: 30.35 KG/M2 | HEIGHT: 70 IN

## 2025-04-15 DIAGNOSIS — R90.89 ABNORMAL BRAIN MRI: ICD-10-CM

## 2025-04-15 DIAGNOSIS — Z23 NEED FOR SHINGLES VACCINE: ICD-10-CM

## 2025-04-15 DIAGNOSIS — M19.011 PRIMARY OSTEOARTHRITIS OF BOTH SHOULDERS: Chronic | ICD-10-CM

## 2025-04-15 DIAGNOSIS — E66.811 OBESITY, CLASS I, BMI 30-34.9: ICD-10-CM

## 2025-04-15 DIAGNOSIS — E78.2 MIXED HYPERLIPIDEMIA: Chronic | ICD-10-CM

## 2025-04-15 DIAGNOSIS — E11.9 TYPE 2 DIABETES MELLITUS WITHOUT COMPLICATION, WITHOUT LONG-TERM CURRENT USE OF INSULIN: Primary | Chronic | ICD-10-CM

## 2025-04-15 DIAGNOSIS — M19.012 PRIMARY OSTEOARTHRITIS OF BOTH SHOULDERS: Chronic | ICD-10-CM

## 2025-04-15 RX ORDER — ROSUVASTATIN CALCIUM 5 MG/1
20 TABLET, COATED ORAL DAILY
Qty: 30 TABLET | Refills: 2 | Status: SHIPPED | OUTPATIENT
Start: 2025-04-15

## 2025-04-15 RX ORDER — SEMAGLUTIDE 0.68 MG/ML
0.5 INJECTION, SOLUTION SUBCUTANEOUS WEEKLY
Qty: 9 ML | Refills: 0 | Status: SHIPPED | OUTPATIENT
Start: 2025-04-15

## 2025-04-15 NOTE — LETTER
[facility]  Vaccine Consent Form    Patient Name:  Elan Freed  Patient :  1965  MRN: [id number]    Vaccine(s) Ordered    Shingrix Vaccine        Screening Checklist  The following questions should be completed prior to vaccination. If you answer “yes” to any question, it does not necessarily mean you should not be vaccinated. It just means we may need to clarify or ask more questions. If a question is unclear, please ask your healthcare provider to explain it.    Yes No   Any fever or moderate to severe illness today (mild illness and/or antibiotic treatment are not contraindications)?     Do you have a history of a serious reaction to any previous vaccinations, such as anaphylaxis, encephalopathy within 7 days, Guillain-Beaverdale syndrome within 6 weeks, seizure?     Have you received any live vaccine(s) (e.g MMR, ABRIL) or any other vaccines in the last month (to ensure duplicate doses aren't given)?     Do you have an anaphylactic allergy to latex (DTaP, DTaP-IPV, Hep A, Hep B, MenB, RV, Td, Tdap), baker’s yeast (Hep B, HPV), polysorbates (RSV, nirsevimab, PCV 20, Rotavirrus, Tdap, Shingrix), or gelatin (ABRIL, MMR)?     Do you have an anaphylactic allergy to neomycin (Rabies, ABRIL, MMR, IPV, Hep A), polymyxin B (IPV), or streptomycin (IPV)?      Any cancer, leukemia, AIDS, or other immune system disorder? (ABRIL, MMR, RV)     Do you have a parent, brother, or sister with an immune system problem (if immune competence of vaccine recipient clinically verified, can proceed)? (MMR, ABRIL)     Any recent steroid treatments for >2 weeks, chemotherapy, or radiation treatment? (ABRIL, MMR)     Have you received antibody-containing blood transfusions or IVIG in the past 11 months (recommended interval is dependent on product)? (MMR, ABRIL)     Have you taken antiviral drugs (acyclovir, famciclovir, valacyclovir for ABRIL) in the last 24 or 48 hours, respectively?      Are you pregnant or planning to become pregnant within 1  "month? (ABRIL, MMR, HPV, IPV, MenB, Abrexvy; For Hep B- refer to Engerix-B; For RSV - Abrysvo is indicated for 32-36 weeks of pregnancy from September to January)     For infants, have you ever been told your child has had intussusception or a medical emergency involving obstruction of the intestine (Rotavirus)? If not for an infant, can skip this question.         *Ordering Physicians/APC should be consulted if \"yes\" is checked by the patient or guardian above.  I have received, read, and understand the Vaccine Information Statement (VIS) for each vaccine ordered.  I have considered my or my child's health status as well as the health status of my close contacts.  I have taken the opportunity to discuss my vaccine questions with my or my child's health care provider.   I have requested that the ordered vaccine(s) be given to me or my child.  I understand the benefits and risks of the vaccines.  I understand that I should remain in the clinic for 15 minutes after receiving the vaccine(s).  _________________________________________________________  Signature of Patient or Parent/Legal Guardian ____________________  Date   "

## 2025-04-15 NOTE — PROGRESS NOTES
"Chief Complaint  Elan Freed is a 60 y.o. male presenting for Hypertension (F/u).     From [place]. Lives with wife. Has son born 1992. Has own business - building swimming pools since 1987. Twin brother passed unexpectedly May 2022.     Patient has a past medical history of CLL (3/2024, ST 0, f/u dr. Mcdonough), hypertension, T2DM (11/2023), JACK, GERD and uncomplicated COVID-19 (3/16/22).    History of Present Illness  Patient is here for follow-up .    Fortunately there was no sign of MS on the spinal tap.  The MRI showed changes concerning for MS versus microvascular versus vasculitis.  He is scheduled for follow-up with neurology and is reassured that there is no diagnosis of MS.    He has been taking Ozempic 0.25 mg weekly for about 3 months, and is doing well.  He has lost about 10 pounds.  No significant side effects.  He is interested in trying a higher dose at this point.    He does keep an eye on his blood pressure, typically ranging around 120/80.    His shoulders are still bothering him some, but he does take Advil occasionally, which seems to help.    He did notice possible soreness of his leg muscles on rosuvastatin 20 mg.  He stopped taking the medication and symptoms went away.  Yesterday he tried again and he felt symptoms coming on  again.  He is amenable to trying a lower dose.    The following portions of the patient's history were reviewed and updated as appropriate: allergies, current medications, past family history, past medical history, past social history, past surgical history, and problem list.    Objective  /74 (BP Location: Left arm, Patient Position: Sitting, Cuff Size: Large Adult)   Pulse 74   Temp 97.5 °F (36.4 °C) (Infrared)   Ht 177.8 cm (70\")   Wt 96.2 kg (212 lb)   SpO2 99%   BMI 30.42 kg/m²     Physical Exam  Constitutional:       Appearance: Normal appearance.   HENT:      Head: Atraumatic.   Eyes:      Extraocular Movements: Extraocular movements intact.      " Conjunctiva/sclera: Conjunctivae normal.   Pulmonary:      Effort: Pulmonary effort is normal. No respiratory distress.   Musculoskeletal:      Cervical back: Neck supple.   Skin:     General: Skin is warm and dry.   Neurological:      Mental Status: He is alert and oriented to person, place, and time. Mental status is at baseline.   Psychiatric:         Behavior: Behavior normal.         Thought Content: Thought content normal.         Assessment/Plan   1. Type 2 diabetes mellitus without complication, without long-term current use of insulin  Hemoglobin A1C   Date Value Ref Range Status   04/08/2025 5.80 (H) 4.80 - 5.60 % Final   01/14/2025 6.2 (A) 4.5 - 5.7 % Final   07/23/2024 6.2 (A) 4.5 - 5.7 % Final   04/23/2024 6.4 (A) 4.5 - 5.7 % Final   01/29/2024 6.70 (H) 4.80 - 5.60 % Final   11/20/2023 6.70 (H) 4.80 - 5.60 % Final   Improved and good glycemic control.  Will increase dose of  Ozempic from 0.25 mg to 0.5 mg weekly.  This due to comorbidities.  He already reports improvement of knee pain.  - Hemoglobin A1c; Future  - Semaglutide,0.25 or 0.5MG/DOS, (Ozempic, 0.25 or 0.5 MG/DOSE,) 2 MG/3ML solution pen-injector; Inject 0.5 mg under the skin into the appropriate area as directed 1 (One) Time Per Week.  Dispense: 9 mL; Refill: 0    2. Mixed hyperlipidemia  Suspect side effects of statin.  Will do trial of lowest dose of rosuvastatin.  Suggest taking it for couple of weeks.  If he starts to experience more muscle aches I would stop it for couple of weeks and then restart for 1-2 weeks to see if there is a clear pattern.  If so we we will have to look at other options.  - rosuvastatin (Crestor) 5 MG tablet; Take 4 tablets by mouth Daily.  Dispense: 30 tablet; Refill: 2  - Lipid Panel; Future  - Comprehensive Metabolic Panel; Future    3. Primary osteoarthritis of both shoulders  Recommend using Tylenol for pain.  He can also take ibuprofen/Advil, suggest 400-600 mg 2-3 times daily.  Activity as tolerated.    4.  Abnormal brain MRI  Follow-up with neurology.  Could be microvascular changes, for which could diabetic control, good blood pressure control and treatment for cholesterol would be important.    5. Need for shingles vaccine  Dose 2/2 administered today  - Shingrix Vaccine    6. Obesity, Class I, BMI 30-34.9  Weight improved over the last 3 months.      Return in about 14 weeks (around 7/22/2025) for Recheck.    Future Appointments           Provider Department Center     4/29/2025 12:30 PM (Arrive by 12:00 PM) Mendoza Dye MD [facility] GASTROENTEROLOGY [facility]     5/6/2025 1:00 PM Annalise Sood DNP, APRN [facility] NEUROLOGY [facility]     7/14/2025 1:00 PM (Arrive by 12:45 PM) Son Mcdonough MD [facility] HEMATOLOGY & ONCOLOGY [facility]     7/22/2025 1:15 PM Cj Bermudez MD [facility] INTERNAL MEDICINE [facility]              Cj Bermudez MD  Family Medicine  04/15/2025

## 2025-04-16 ENCOUNTER — TELEPHONE (OUTPATIENT)
Dept: INTERNAL MEDICINE | Facility: CLINIC | Age: 60
End: 2025-04-16
Payer: COMMERCIAL

## 2025-04-16 NOTE — TELEPHONE ENCOUNTER
Advised patient via my chart that  I have received a prior authorization request from the pharmacy for the ROSUVASTATIN  CALCIUM   5 mg tablet. I submitted the request to the insurance company and once I have a response back I will let him now.

## 2025-04-21 ENCOUNTER — TELEPHONE (OUTPATIENT)
Dept: INTERNAL MEDICINE | Facility: CLINIC | Age: 60
End: 2025-04-21
Payer: COMMERCIAL

## 2025-04-21 NOTE — TELEPHONE ENCOUNTER
Advised patient via my chart that I have received a denial letter from the insurance company for the drug ROSUVASTATIN 5 mg tab. I have started an appeal to try and get this overturned . A copy of this letter will be scanned into the chart.

## 2025-04-25 ENCOUNTER — TELEPHONE (OUTPATIENT)
Dept: INTERNAL MEDICINE | Facility: CLINIC | Age: 60
End: 2025-04-25
Payer: COMMERCIAL

## 2025-04-25 NOTE — TELEPHONE ENCOUNTER
I do not know the answer to patient's question, however I know looking at [facility] has 90-day supply for $13.

## 2025-04-25 NOTE — TELEPHONE ENCOUNTER
Advised patient via my chart  that the insurance company has denied my appeal . However if he uses [facility] he can get the medication for $46.51 at his [facility] pharmacy. A copy of this letter will be scanned into his chart .

## 2025-04-28 ENCOUNTER — TELEPHONE (OUTPATIENT)
Dept: INTERNAL MEDICINE | Facility: CLINIC | Age: 60
End: 2025-04-28
Payer: COMMERCIAL

## 2025-04-28 NOTE — TELEPHONE ENCOUNTER
Advised patient via my chart that I have received an approval letter from the insurance company for the medication ROSUVASTATIN  5 MG tablets. This authorization is approved from 04/25/25 to 04/25/26.  A copy of this letter will be scanned into the chart

## 2025-04-29 ENCOUNTER — LAB REQUISITION (OUTPATIENT)
Dept: LAB | Facility: HOSPITAL | Age: 60
End: 2025-04-29
Payer: COMMERCIAL

## 2025-04-29 ENCOUNTER — OUTSIDE FACILITY SERVICE (OUTPATIENT)
Dept: GASTROENTEROLOGY | Facility: CLINIC | Age: 60
End: 2025-04-29
Payer: COMMERCIAL

## 2025-04-29 DIAGNOSIS — Z12.11 ENCOUNTER FOR SCREENING FOR MALIGNANT NEOPLASM OF COLON: ICD-10-CM

## 2025-04-29 PROCEDURE — 88305 TISSUE EXAM BY PATHOLOGIST: CPT | Performed by: INTERNAL MEDICINE

## 2025-04-30 ENCOUNTER — RESULTS FOLLOW-UP (OUTPATIENT)
Dept: LAB | Facility: HOSPITAL | Age: 60
End: 2025-04-30
Payer: COMMERCIAL

## 2025-04-30 LAB — REF LAB TEST METHOD: NORMAL

## 2025-04-30 NOTE — LETTER
"Reji Donaldson Abdiaziz  [address] [place] [address] [address] [place] [address]    May 5, 2025     Dear Mr. Freed:    Below are the results from your recent visit:    Resulted Orders   TISSUE EXAM, [facility] ([facility]I,[facility])   Result Value Ref Range    Reference Lab Report       [facility]  [address] [place] [address]  Phone: [phone] or [phone]  Fax: [phone]  Sergio Sweeney M.D., Medical Director    PATIENT NAME                                     LABORATORY NO.  [id number]   REJI FREED                                   [id number]  [id number]                                 AGE                    SEX   SSN              CLIENT REF #  [facility]                   60        1965      M     [id number]      [id number]    [address]                      REQUESTING M.D.           ATTENDING [place] [address]                        JODI WILKERSON BJORN  DATE COLLECTED            DATE RECEIVED          DATE REPORTED  2025    DIAGNOSIS:  SIGMOID COLON POLYPS, X3:  Hyperplastic polyps    RLL    CLINICAL HISTORY:  Encounter for screening for malignant neoplasm of colon    SPECIMENS  RECEIVED:  SIGMOID COLON POLYPS, X3    MICROSCOPIC DESCRIPTION:  Tissue blocks are prepared and slides are examined microscopically on all  specimens. See diagnosis for details.    Professional interpretation rendered by Sergio Sweeney M.D., F.C.A.P. at  [facility], [address] [place] [address].    GROSS DESCRIPTION:  Labeled \"sigmoid polyp\", consisting of multiple pieces of tan soft tissue  measuring 1.1 x 0.7 x 0.2 cm in aggregate, submitted entirely in 1 cassette.  SO[facility]    REVIEWED, DIAGNOSED AND ELECTRONICALLY  SIGNED BY:    Sergio Sweeney M.D., F.C.A.P.  CPT CODES:  58472         , the polyps that were removed were benign.  Follow-up in 5 years .       "     Sincerely,        Mendoza Dye MD

## 2025-05-06 ENCOUNTER — PATIENT MESSAGE (OUTPATIENT)
Dept: NEUROLOGY | Facility: CLINIC | Age: 60
End: 2025-05-06
Payer: COMMERCIAL

## 2025-05-12 DIAGNOSIS — I10 ESSENTIAL HYPERTENSION: Chronic | ICD-10-CM

## 2025-05-12 DIAGNOSIS — E78.2 MIXED HYPERLIPIDEMIA: Chronic | ICD-10-CM

## 2025-05-13 DIAGNOSIS — R20.0 NUMBNESS AND TINGLING: ICD-10-CM

## 2025-05-13 DIAGNOSIS — R20.2 NUMBNESS AND TINGLING: ICD-10-CM

## 2025-05-13 RX ORDER — LOSARTAN POTASSIUM 100 MG/1
100 TABLET ORAL DAILY
Qty: 30 TABLET | Refills: 3 | Status: SHIPPED | OUTPATIENT
Start: 2025-05-13

## 2025-05-13 RX ORDER — HYDROCHLOROTHIAZIDE 12.5 MG/1
12.5 CAPSULE ORAL DAILY
Qty: 90 CAPSULE | Refills: 0 | Status: SHIPPED | OUTPATIENT
Start: 2025-05-13

## 2025-05-13 RX ORDER — ROSUVASTATIN CALCIUM 5 MG/1
5 TABLET, COATED ORAL DAILY
Qty: 30 TABLET | Refills: 5 | Status: SHIPPED | OUTPATIENT
Start: 2025-05-13

## 2025-05-13 NOTE — TELEPHONE ENCOUNTER
Rx Refill Note  Requested Prescriptions     Pending Prescriptions Disp Refills    amitriptyline (ELAVIL) 25 MG tablet [Pharmacy Med Name: AMITRIPTYLINE 25MG TABLETS] 30 tablet 0     Sig: TAKE 1 TABLET BY MOUTH EVERY NIGHT      Last filled: 4/14/25 30 with 0 refills.  Last office visit with prescribing clinician: 1/27/2025      Next office visit with prescribing clinician: 6/26/2025     Sent in 90 with 3 refills.    Adalgisa Freitas MA  05/13/25, 08:01 EDT

## 2025-05-23 DIAGNOSIS — E11.9 TYPE 2 DIABETES MELLITUS WITHOUT COMPLICATION, WITHOUT LONG-TERM CURRENT USE OF INSULIN: Chronic | ICD-10-CM

## 2025-05-27 RX ORDER — SEMAGLUTIDE 0.68 MG/ML
0.5 INJECTION, SOLUTION SUBCUTANEOUS WEEKLY
Qty: 9 ML | Refills: 0 | Status: SHIPPED | OUTPATIENT
Start: 2025-05-27

## 2025-05-27 NOTE — TELEPHONE ENCOUNTER
Rx Refill Note  Requested Prescriptions     Pending Prescriptions Disp Refills    Semaglutide,0.25 or 0.5MG/DOS, (Ozempic, 0.25 or 0.5 MG/DOSE,) 2 MG/3ML solution pen-injector 9 mL 0     Sig: Inject 0.5 mg under the skin into the appropriate area as directed 1 (One) Time Per Week.      Last office visit with prescribing clinician: 4/15/2025   Last telemedicine visit with prescribing clinician: Visit date not found   Next office visit with prescribing clinician: 7/22/2025                         Would you like a call back once the refill request has been completed: [] Yes [] No    If the office needs to give you a call back, can they leave a voicemail: [] Yes [] No    Ana Garvey LPN  05/27/25, 08:01 EDT

## 2025-05-28 ENCOUNTER — PATIENT MESSAGE (OUTPATIENT)
Dept: INTERNAL MEDICINE | Facility: CLINIC | Age: 60
End: 2025-05-28
Payer: COMMERCIAL

## 2025-05-28 DIAGNOSIS — K21.9 GASTROESOPHAGEAL REFLUX DISEASE WITHOUT ESOPHAGITIS: Chronic | ICD-10-CM

## 2025-05-28 RX ORDER — OMEPRAZOLE 20 MG/1
20 CAPSULE, DELAYED RELEASE ORAL DAILY
Qty: 90 CAPSULE | Refills: 1 | Status: SHIPPED | OUTPATIENT
Start: 2025-05-28

## 2025-05-30 DIAGNOSIS — E11.9 TYPE 2 DIABETES MELLITUS WITHOUT COMPLICATION, WITHOUT LONG-TERM CURRENT USE OF INSULIN: Chronic | ICD-10-CM

## 2025-05-30 DIAGNOSIS — K21.9 GASTROESOPHAGEAL REFLUX DISEASE WITHOUT ESOPHAGITIS: Chronic | ICD-10-CM

## 2025-06-02 RX ORDER — OMEPRAZOLE 20 MG/1
20 CAPSULE, DELAYED RELEASE ORAL DAILY
Qty: 90 CAPSULE | Refills: 1 | OUTPATIENT
Start: 2025-06-02

## 2025-06-02 RX ORDER — SEMAGLUTIDE 0.68 MG/ML
0.5 INJECTION, SOLUTION SUBCUTANEOUS WEEKLY
Qty: 9 ML | Refills: 0 | OUTPATIENT
Start: 2025-06-02

## 2025-06-05 DIAGNOSIS — R20.2 NUMBNESS AND TINGLING: ICD-10-CM

## 2025-06-05 DIAGNOSIS — R20.0 NUMBNESS AND TINGLING: ICD-10-CM

## 2025-06-05 NOTE — TELEPHONE ENCOUNTER
Rx Refill Note  Requested Prescriptions     Pending Prescriptions Disp Refills    amitriptyline (ELAVIL) 25 MG tablet 90 tablet 3     Sig: Take 1 tablet by mouth Every Night.      Last filled: 5/13/25 90 with 3 refills.    Last office visit with prescribing clinician: 1/27/2025      Next office visit with prescribing clinician: 6/26/2025     Too soon to refill.    Adalgisa Freitas MA  06/05/25, 11:08 EDT

## 2025-06-13 ENCOUNTER — PATIENT MESSAGE (OUTPATIENT)
Dept: NEUROLOGY | Facility: CLINIC | Age: 60
End: 2025-06-13
Payer: COMMERCIAL

## 2025-06-13 DIAGNOSIS — R20.2 NUMBNESS AND TINGLING: ICD-10-CM

## 2025-06-13 DIAGNOSIS — R20.0 NUMBNESS AND TINGLING: ICD-10-CM
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
## 2025-06-26 ENCOUNTER — OFFICE VISIT (OUTPATIENT)
Dept: NEUROLOGY | Facility: CLINIC | Age: 60
End: 2025-06-26
Payer: COMMERCIAL

## 2025-06-26 VITALS
WEIGHT: 210.8 LBS | OXYGEN SATURATION: 97 % | HEART RATE: 72 BPM | SYSTOLIC BLOOD PRESSURE: 134 MMHG | DIASTOLIC BLOOD PRESSURE: 86 MMHG | BODY MASS INDEX: 30.18 KG/M2 | HEIGHT: 70 IN

## 2025-06-26 DIAGNOSIS — G56.01 CARPAL TUNNEL SYNDROME OF RIGHT WRIST: ICD-10-CM

## 2025-06-26 DIAGNOSIS — R20.2 NUMBNESS AND TINGLING: Primary | ICD-10-CM

## 2025-06-26 DIAGNOSIS — R20.0 NUMBNESS AND TINGLING: Primary | ICD-10-CM

## 2025-06-26 DIAGNOSIS — R90.82 WHITE MATTER DISEASE: ICD-10-CM

## 2025-06-26 PROCEDURE — 99213 OFFICE O/P EST LOW 20 MIN: CPT | Performed by: NURSE PRACTITIONER

## 2025-06-29 DIAGNOSIS — E11.9 TYPE 2 DIABETES MELLITUS WITHOUT COMPLICATION, WITHOUT LONG-TERM CURRENT USE OF INSULIN: Chronic | ICD-10-CM

## 2025-06-30 RX ORDER — SEMAGLUTIDE 0.68 MG/ML
0.5 INJECTION, SOLUTION SUBCUTANEOUS WEEKLY
Qty: 9 ML | Refills: 0 | Status: SHIPPED | OUTPATIENT
Start: 2025-06-30

## 2025-07-11 DIAGNOSIS — F41.1 GAD (GENERALIZED ANXIETY DISORDER): Chronic | ICD-10-CM

## 2025-07-11 RX ORDER — HYDROXYZINE HYDROCHLORIDE 25 MG/1
TABLET, FILM COATED ORAL
Qty: 90 TABLET | Refills: 1 | Status: SHIPPED | OUTPATIENT
Start: 2025-07-11

## 2025-07-11 NOTE — TELEPHONE ENCOUNTER
Rx Refill Note  Requested Prescriptions     Pending Prescriptions Disp Refills    hydrOXYzine (ATARAX) 25 MG tablet [Pharmacy Med Name: HYDROXYZINE HCL 25MG TABS (WHITE)] 90 tablet 1     Sig: TAKE 1/2 TO 2 TABLETS BY MOUTH AT NIGHT AS NEEDED FOR ANXIETY OR SLEEP      Last office visit with prescribing clinician: 4/15/2025   Last telemedicine visit with prescribing clinician: Visit date not found   Next office visit with prescribing clinician: 7/22/2025                         Would you like a call back once the refill request has been completed: [] Yes [] No    If the office needs to give you a call back, can they leave a voicemail: [] Yes [] No    Irene Boateng MA  07/11/25, 08:01 EDT  
No

## 2025-07-15 DIAGNOSIS — I10 ESSENTIAL HYPERTENSION: Chronic | ICD-10-CM

## 2025-07-16 RX ORDER — ERGOCALCIFEROL 1.25 MG/1
50000 CAPSULE, LIQUID FILLED ORAL DAILY
Qty: 5 CAPSULE | Refills: 0 | Status: SHIPPED | OUTPATIENT
Start: 2025-07-16

## 2025-07-16 RX ORDER — HYDROCHLOROTHIAZIDE 12.5 MG/1
12.5 CAPSULE ORAL DAILY
Qty: 90 CAPSULE | Refills: 0 | Status: SHIPPED | OUTPATIENT
Start: 2025-07-16

## 2025-07-16 RX ORDER — LOSARTAN POTASSIUM 100 MG/1
100 TABLET ORAL DAILY
Qty: 30 TABLET | Refills: 3 | OUTPATIENT
Start: 2025-07-16

## 2025-07-16 NOTE — TELEPHONE ENCOUNTER
Rx Refill Note  Requested Prescriptions     Pending Prescriptions Disp Refills    vitamin D (ERGOCALCIFEROL) 1.25 MG (47952 UT) capsule capsule 5 capsule 0     Sig: Take 1 capsule by mouth Daily.    losartan (COZAAR) 100 MG tablet 30 tablet 3     Sig: Take 1 tablet by mouth Daily.    hydroCHLOROthiazide (MICROZIDE) 12.5 MG capsule 90 capsule 0     Sig: Take 1 capsule by mouth Daily. Stop nifedipine.  Continue losartan.      Last office visit with prescribing clinician: 4/15/2025   Last telemedicine visit with prescribing clinician: Visit date not found   Next office visit with prescribing clinician: 7/22/2025                         Would you like a call back once the refill request has been completed: [] Yes [] No    If the office needs to give you a call back, can they leave a voicemail: [] Yes [] No    Mariposa Nuno MA  07/16/25, 08:06 EDT

## 2025-07-22 ENCOUNTER — LAB (OUTPATIENT)
Dept: LAB | Facility: HOSPITAL | Age: 60
End: 2025-07-22
Payer: COMMERCIAL

## 2025-07-22 ENCOUNTER — OFFICE VISIT (OUTPATIENT)
Dept: INTERNAL MEDICINE | Facility: CLINIC | Age: 60
End: 2025-07-22
Payer: COMMERCIAL

## 2025-07-22 ENCOUNTER — TELEPHONE (OUTPATIENT)
Dept: ONCOLOGY | Facility: CLINIC | Age: 60
End: 2025-07-22
Payer: COMMERCIAL

## 2025-07-22 ENCOUNTER — TELEPHONE (OUTPATIENT)
Dept: INTERNAL MEDICINE | Facility: CLINIC | Age: 60
End: 2025-07-22

## 2025-07-22 VITALS
OXYGEN SATURATION: 96 % | HEART RATE: 85 BPM | HEIGHT: 70 IN | DIASTOLIC BLOOD PRESSURE: 72 MMHG | WEIGHT: 208 LBS | BODY MASS INDEX: 29.78 KG/M2 | SYSTOLIC BLOOD PRESSURE: 124 MMHG | TEMPERATURE: 98 F

## 2025-07-22 DIAGNOSIS — I10 ESSENTIAL HYPERTENSION: Chronic | ICD-10-CM
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
## 2025-07-22 DIAGNOSIS — L98.9 LESION OF SKIN OF NOSE: ICD-10-CM

## 2025-07-22 DIAGNOSIS — E78.2 MIXED HYPERLIPIDEMIA: Chronic | ICD-10-CM

## 2025-07-22 DIAGNOSIS — C91.10 CLL (CHRONIC LYMPHOCYTIC LEUKEMIA): Chronic | ICD-10-CM

## 2025-07-22 DIAGNOSIS — C91.10 CLL (CHRONIC LYMPHOCYTIC LEUKEMIA): ICD-10-CM

## 2025-07-22 DIAGNOSIS — E11.9 TYPE 2 DIABETES MELLITUS WITHOUT COMPLICATION, WITHOUT LONG-TERM CURRENT USE OF INSULIN: Primary | Chronic | ICD-10-CM

## 2025-07-22 LAB
ALBUMIN SERPL-MCNC: 4.9 G/DL (ref 3.5–5.2)
ALBUMIN/GLOB SERPL: 1.8 G/DL
ALP SERPL-CCNC: 46 U/L (ref 39–117)
ALT SERPL W P-5'-P-CCNC: 25 U/L (ref 1–41)
ANION GAP SERPL CALCULATED.3IONS-SCNC: 13.3 MMOL/L (ref 5–15)
AST SERPL-CCNC: 26 U/L (ref 1–40)
BASOPHILS # BLD MANUAL: 0.63 10*3/MM3 (ref 0–0.2)
BASOPHILS NFR BLD MANUAL: 2 % (ref 0–1.5)
BILIRUB SERPL-MCNC: 0.5 MG/DL (ref 0–1.2)
BUN SERPL-MCNC: 24 MG/DL (ref 6–20)
BUN/CREAT SERPL: 15.6 (ref 7–25)
CALCIUM SPEC-SCNC: 10.1 MG/DL (ref 8.6–10.5)
CHLORIDE SERPL-SCNC: 101 MMOL/L (ref 98–107)
CHOLEST SERPL-MCNC: 134 MG/DL (ref 0–200)
CO2 SERPL-SCNC: 25.7 MMOL/L (ref 22–29)
CREAT SERPL-MCNC: 1.54 MG/DL (ref 0.76–1.27)
DEPRECATED RDW RBC AUTO: 42.9 FL (ref 37–54)
EGFRCR SERPLBLD CKD-EPI 2021: 51.6 ML/MIN/1.73
EOSINOPHIL # BLD MANUAL: 0 10*3/MM3 (ref 0–0.4)
EOSINOPHIL NFR BLD MANUAL: 0 % (ref 0.3–6.2)
ERYTHROCYTE [DISTWIDTH] IN BLOOD BY AUTOMATED COUNT: 13 % (ref 12.3–15.4)
EXPIRATION DATE: NORMAL
GLOBULIN UR ELPH-MCNC: 2.8 GM/DL
GLUCOSE SERPL-MCNC: 88 MG/DL (ref 65–99)
HBA1C MFR BLD: 5.7 % (ref 4.5–5.7)
HCT VFR BLD AUTO: 46.8 % (ref 37.5–51)
HDLC SERPL-MCNC: 41 MG/DL (ref 40–60)
HGB BLD-MCNC: 15.6 G/DL (ref 13–17.7)
LDLC SERPL CALC-MCNC: 76 MG/DL (ref 0–100)
LDLC/HDLC SERPL: 1.84 {RATIO}
LYMPHOCYTES # BLD MANUAL: 24.86 10*3/MM3 (ref 0.7–3.1)
LYMPHOCYTES NFR BLD MANUAL: 3 % (ref 5–12)
Lab: NORMAL
MCH RBC QN AUTO: 30.4 PG (ref 26.6–33)
MCHC RBC AUTO-ENTMCNC: 33.3 G/DL (ref 31.5–35.7)
MCV RBC AUTO: 91.2 FL (ref 79–97)
MONOCYTES # BLD: 0.94 10*3/MM3 (ref 0.1–0.9)
NEUTROPHILS # BLD AUTO: 5.04 10*3/MM3 (ref 1.7–7)
NEUTROPHILS NFR BLD MANUAL: 15 % (ref 42.7–76)
NEUTS BAND NFR BLD MANUAL: 1 % (ref 0–5)
PLAT MORPH BLD: NORMAL
PLATELET # BLD AUTO: 199 10*3/MM3 (ref 140–450)
PMV BLD AUTO: 10.5 FL (ref 6–12)
POTASSIUM SERPL-SCNC: 4.5 MMOL/L (ref 3.5–5.2)
PROT SERPL-MCNC: 7.7 G/DL (ref 6–8.5)
RBC # BLD AUTO: 5.13 10*6/MM3 (ref 4.14–5.8)
RBC MORPH BLD: NORMAL
SMUDGE CELLS BLD QL SMEAR: ABNORMAL
SODIUM SERPL-SCNC: 140 MMOL/L (ref 136–145)
TRIGL SERPL-MCNC: 88 MG/DL (ref 0–150)
VARIANT LYMPHS NFR BLD MANUAL: 11 % (ref 0–5)
VARIANT LYMPHS NFR BLD MANUAL: 68 % (ref 19.6–45.3)
VLDLC SERPL-MCNC: 17 MG/DL (ref 5–40)
WBC NRBC COR # BLD AUTO: 31.47 10*3/MM3 (ref 3.4–10.8)

## 2025-07-22 PROCEDURE — 85025 COMPLETE CBC W/AUTO DIFF WBC: CPT

## 2025-07-22 PROCEDURE — 80053 COMPREHEN METABOLIC PANEL: CPT

## 2025-07-22 PROCEDURE — 85007 BL SMEAR W/DIFF WBC COUNT: CPT

## 2025-07-22 PROCEDURE — 80061 LIPID PANEL: CPT

## 2025-07-22 PROCEDURE — 36415 COLL VENOUS BLD VENIPUNCTURE: CPT

## 2025-07-22 RX ORDER — ERGOCALCIFEROL 1.25 MG/1
50000 CAPSULE, LIQUID FILLED ORAL
Qty: 5 CAPSULE | Refills: 1 | Status: SHIPPED | OUTPATIENT
Start: 2025-07-22

## 2025-07-22 NOTE — PROGRESS NOTES
"Chief Complaint  Elan Freed is a 60 y.o. male presenting for Hypertension (Labs).     From [place]. Lives with wife. Has son born 1992. Has own business - building swimming pools since 1987. Twin brother passed unexpectedly May 2022.     Patient has a past medical history of CLL (3/2024, ST 0, f/u dr. Mcdonough), hypertension, T2DM (11/2023), JACK, GERD and uncomplicated COVID-19 (3/16/22).    History of Present Illness  Patient is here for follow-up.    He is overall doing well.  He was seen at urgent care for tick bite about 1 month ago, prescribed 10-day course of doxycycline.  The tick had been on his back for a few days when he discovered.  He still has some itching of this area.    For years he has had a skin lesion on the proximal part of his nose on the dorsal side.  This has never been evaluated by dermatology.    He has upcoming appointment with heme-onc Dr. Mcdonough.  He went for blood work today including CBC.    He continues to take Ozempic for his diabetes.  This helps curb his appetite.  His weight is currently fairly stable.  For now he wants to continue on his current dose of 0.5 mg.    The following portions of the patient's history were reviewed and updated as appropriate: allergies, current medications, past family history, past medical history, past social history, past surgical history, and problem list.    Image captured per patient verbal consent:          Objective  /72 (BP Location: Left arm, Patient Position: Sitting, Cuff Size: Adult)   Pulse 85   Temp 98 °F (36.7 °C)   Ht 177.8 cm (70\")   Wt 94.3 kg (208 lb)   SpO2 96%   BMI 29.84 kg/m²     Physical Exam  Constitutional:       Appearance: Normal appearance.   HENT:      Head: Atraumatic.     Eyes:      Extraocular Movements: Extraocular movements intact.      Conjunctiva/sclera: Conjunctivae normal.   Pulmonary:      Effort: Pulmonary effort is normal. No respiratory distress.   Musculoskeletal:      Cervical back: Neck " supple.   Skin:     General: Skin is warm and dry.          Neurological:      Mental Status: He is alert and oriented to person, place, and time. Mental status is at baseline.   Psychiatric:         Behavior: Behavior normal.         Thought Content: Thought content normal.         Assessment/Plan   1. Type 2 diabetes mellitus without complication, without long-term current use of insulin  Hemoglobin A1C   Date Value Ref Range Status   07/22/2025 5.7 4.5 - 5.7 % Final   04/08/2025 5.80 (H) 4.80 - 5.60 % Final   01/14/2025 6.2 (A) 4.5 - 5.7 % Final   07/23/2024 6.2 (A) 4.5 - 5.7 % Final   01/29/2024 6.70 (H) 4.80 - 5.60 % Final   11/20/2023 6.70 (H) 4.80 - 5.60 % Final   Good glycemic control.  Continue on Ozempic 0.5 mg and metformin 500 mg with breakfast.  Follow-up in 3 months  - POC Glycosylated Hemoglobin (Hb A1C)    2. Essential hypertension  BP Readings from Last 3 Encounters:   07/22/25 124/72   06/26/25 134/86   06/18/25 124/79   Blood pressure overall well-controlled.  Continue on current medications.    3. CLL (chronic lymphocytic leukemia)  Stable, doing well.    4. Lesion of skin of nose  Not sure if this skin lesion needs biopsy.  Will refer to dermatology  - Ambulatory Referral to Dermatology    5. Tick bite, subsequent encounter  Should be adequately treated with doxycycline for 10 days.  Would be unexpected to get Lyme with treatment this early.      Return in about 13 weeks (around 10/21/2025) for Recheck.    Future Appointments         Provider Department Center    7/24/2025 9:00 AM (Arrive by 8:45 AM) Son Mcdonough MD [facility] H[facility]ATOLOGY [facility]    10/21/2025 1:15 PM (Arrive by 1:00 PM) Cj Bermudez MD [facility] INTERNAL MEDICINE [facility]              Cj Bermudez MD  Family Medicine  07/22/2025

## 2025-07-22 NOTE — TELEPHONE ENCOUNTER
Critical Test Results      MD: Karuna Goodwin, APRN    Date: 07-22-25     Critical test result: WBC = 31.47    Time results received: 14:50      Received from Bertha from [facility].

## 2025-07-22 NOTE — TELEPHONE ENCOUNTER
Name of Physician notified: Son Mcdonough MD    Time Physician Notified: 1600      []  Orders received    []  Protocol/Standing orders followed    [x]  No new orders

## 2025-07-23 PROBLEM — N28.9 FUNCTION KIDNEY DECREASED: Status: ACTIVE | Noted: 2025-07-23

## 2025-07-24 ENCOUNTER — OFFICE VISIT (OUTPATIENT)
Dept: ONCOLOGY | Facility: CLINIC | Age: 60
End: 2025-07-24
Payer: COMMERCIAL

## 2025-07-24 VITALS
RESPIRATION RATE: 18 BRPM | BODY MASS INDEX: 29.63 KG/M2 | WEIGHT: 207 LBS | HEIGHT: 70 IN | SYSTOLIC BLOOD PRESSURE: 131 MMHG | HEART RATE: 78 BPM | DIASTOLIC BLOOD PRESSURE: 81 MMHG | OXYGEN SATURATION: 95 % | TEMPERATURE: 97.8 F

## 2025-07-24 DIAGNOSIS — C91.10 CLL (CHRONIC LYMPHOCYTIC LEUKEMIA): Primary | Chronic | ICD-10-CM

## 2025-07-24 NOTE — LETTER
July 24, 2025     Cj Bermudez MD  [address]  [address]  [facility] [address]    Patient: Elan Freed   YOB: 1965   Date of Visit: 7/24/2025     Dear Cj Bermudez MD:       Thank you for referring Elan Freed to me for evaluation. Below are the relevant portions of my assessment and plan of care.    If you have questions, please do not hesitate to call me. I look forward to following Elan along with you.         Sincerely,        Son Mcdonough MD        CC: No Recipients    Son Mcdonough MD  07/24/25 0906  Sign when Signing Visit  CHIEF COMPLAINT: No new somatic complaints    Problem List:  Oncology/Hematology History Overview Note   1. CLL stage 0  2.  Reflux  3.  Hypertension  4.  Hyperlipidemia  5.  Type 2 diabetes  6.  Elevated ALT with fatty liver on CT  7.  Colon polyps  8.  Granulomatous calcifications in the spleen on CT    Hematology history timeline:  -2/19/2016 CT angiogram chest [facility] states lymph nodes were normal.  Multiple small pulmonary nodules mostly in the upper lung zone 4-5 mm with recommended follow-up in 6-9 months CT abdomen pelvis showed no adenopathy or splenomegaly.  Spleen was described as normal  -3/16/2022 white count 13,650 hemoglobin 14.7 platelets 209,000.  Absolute lymphocyte count 9960.  CT angiogram chest shows mild bilateral axillary adenopathy right axilla 1.8 x 1.3 cm node, left axilla 2.6 x 2 cm node.  No pulmonary embolus.  Doppler venous imaging negative.  -1/22/2024 CT chest low-dose cancer screening compared to 3/16/2022 shows medial left apical lung scarring.  No mediastinal mass.  Right epicardial lymph node increased from 5 mm now 10 mm.  A couple of right lower epicardial nodes largest previously 12 mm now 14 mm.  Smaller more rounded nodule previously 8 mm now 11 mm.  Axillary adenopathy gradually enlarged.  Right sided node 14 x 18 mm previously now 17 x 21 mm.  Diminished or absent hilar fat suggesting reactive  or infiltrative nodes.  Diffuse fatty liver change.  Some shotty retroperitoneal nodes may be present.  Bones normal.  Lung RADS 1 for lung cancer but could have low-grade lymphoproliferative adenopathy.  -1/29/2024 white count 24,500 hemoglobin 15.9 platelets 205,000.  Absolute lymphocyte count 17,930.  Sedimentation rate 7.  LDH normal 197.  Glucose 117 BUN 25 bicarb 21.7 ALT 42 otherwise unremarkable CMP.  ACE level normal 17.    -2/16/2024 [facility] hematology consult: Patient has asymptomatic slowly enlarging adenopathy on screening CT of the chest for lung cancer lung RADS 1.  With this and has rising lymphocyte count as documented above.  No anemia or thrombocytopenia.  No effusions.  No splenomegaly.  No frequent infections or bed drenching night sweats or unexplained fevers or chills.  May well be CLL but we will get his integrated oncology testing performed and I will see him back but even if we confirm this I will not treat him unless he reaches the above criteria.  I will see him back in a few weeks to go over all this.    -2/16/2024 White count 23,880 with hemoglobin 15 platelets 196,000 with absolute lymphocyte count 17,220.  He met a pathology review shows lymphocytosis and 72% of white blood cells with abnormal CD5 clone consistent with CLL.  Hemoglobin 15 platelets 198,000.  Flow cytometry shows abnormal CD5 B-cell population 50% of leukocytes consistent with chronic lymphocytic leukemia/small lymphocytic lymphoma immunophenotype.  CLL FISH panel showed deletion 13 q. but no other genetic aberrancy.  Immunoglobulin variable heavy chain sequencing did not yield interpretable results  ZAP70 positive/CD38 negative/CD49 negative.  P53 negative DNA sequencing.  Clonal immunoglobulin heavy chain and kappa light chain gene rearrangement detected as well as clonal T-cell receptor beta gene rearrangement detected.  No BCL1 or Bcl-2 gene rearrangement.  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 loss of heterozygosity and gain of chromosome 21.  Patients with 13 q. deletion is still normally have longer survival compared to other prognostic groups.  By allelic loss which he has does not confer any additional prognostic information and is the same as mono allelic loss.    -3/15/2024 [facility] hematology consult: The above data indicates he has stage 0 CLL with fairly good prognostic features.  For now there is no indication for treatment such as…  Hemoglobin less than 10 not hemolytic  Platelets less than 100,000 not ITP  10 cm or greater nodes or symptomatic nodes  Splenomegaly  Unexplained B symptoms  Frequent infections  Symptomatic effusions  For now we will go with watchful waiting and he will see my nurse practitioner every 3 months for the next year to see with the jerrica of this is.  If he has absolute lymphocyte count doubling time less than 6 months that can be a soft indication for institution of therapy.    -7/15/2024 [facility] Hematology/Oncology clinic follow-up:  Elan is doing well, CBC overall is stable.  White count is a little higher at 30,850 with absolute lymphocytes 26.19, CBC is otherwise normal, no anemia hemoglobin 14.1, hematocrit 42.6%, platelet count is normal at 190,000.  He has no palpable lymphadenopathy.  He has no \"B symptoms\".  We will continue surveillance with repeat CBC in 3 months and I have ordered that today.     -10/7/2024 [facility] hematology/oncology clinic follow-up: CBC WBC 33.47, hemoglobin 14.6, hematocrit 43.4%, platelet count 189,000, absolute lymphocytes 28.67.  Repeat in 3 months.    -1/14/2025 [facility] hematology clinic follow-up: doing well.  CBC is stable, he has no signs of progressive CLL. WBC is 29.08, hemoglobin 15.1, hematocrit 44.9%, platelet count normal at 207,000, absolute lymphocytes 24.09 with ANC of 4.02.  He has no lymphadenopathy.  No frequent illnesses or B symptoms.  We " "will continue surveillance with CBC again in 6 months on return.  - 7/22/2025 white count stable 31,470 with normal hemoglobin and platelets with absolute lymphocyte count stable 24,860.    - 7/24/2025 [facility] hematology follow-up: CBC stable.  No progression of his stage 0 CLL.  No adenopathy or B symptoms or other indications for treatment.  Repeat CBC prior to return to nurse practitioner 6 months     CLL (chronic lymphocytic leukemia)   3/15/2024 Initial Diagnosis    CLL (chronic lymphocytic leukemia)         HISTORY OF PRESENT ILLNESS:  The patient is a 60 y.o. male, here for follow up on management of CLL stage 0 on surveillance    Past Medical History:   Diagnosis Date   • Anxiety    • Arthritis    • Cancer Feb 2024p    CLL   • Essential hypertension 03/18/2022   • JACK (generalized anxiety disorder) 03/18/2022   • Gastroesophageal reflux disease without esophagitis 11/20/2023   • History of COVID-19 03/18/2022   • Mixed hyperlipidemia 11/21/2023   • Peripheral neuropathy Feb 2024   • Primary osteoarthritis of both shoulders 11/22/2023   • Type 2 diabetes mellitus without complication, without long-term current use of insulin 11/21/2023 11/2023 A1c 6.7     Past Surgical History:   Procedure Laterality Date   • NASAL SINUS SURGERY  2024       No Known Allergies    Family History and Social History reviewed and changed as necessary    REVIEW OF SYSTEM:   No bed drenching night sweats or unexplained weight loss.  No frequent infections.    PHYSICAL EXAM:  No jaundice icterus or pallor.  No respiratory distress.    Vitals:    07/24/25 0844   BP: 131/81   Pulse: 78   Resp: 18   Temp: 97.8 °F (36.6 °C)   SpO2: 95%   Weight: 93.9 kg (207 lb)   Height: 177.8 cm (70\")     Vitals:    07/24/25 0844   PainSc: 0-No pain          ECOG score: 0           Vitals reviewed.    ECOG: (0) Fully Active - Able to Carry On All Pre-disease Performance Without Restriction    Lab Results   Component Value Date    HGB 15.6 07/22/2025 "    HCT 46.8 07/22/2025    MCV 91.2 07/22/2025     07/22/2025    WBC 31.47 (C) 07/22/2025    NEUTROABS 5.04 07/22/2025    LYMPHSABS 24.09 (H) 01/14/2025    MONOSABS 0.70 01/14/2025    EOSABS 0.00 07/22/2025    BASOSABS 0.63 (H) 07/22/2025       Lab Results   Component Value Date    GLUCOSE 88 07/22/2025    BUN 24.0 (H) 07/22/2025    CREATININE 1.54 (H) 07/22/2025     07/22/2025    K 4.5 07/22/2025     07/22/2025    CO2 25.7 07/22/2025    CALCIUM 10.1 07/22/2025    PROTEINTOT 7.7 07/22/2025    ALBUMIN 4.9 07/22/2025    BILITOT 0.5 07/22/2025    ALKPHOS 46 07/22/2025    AST 26 07/22/2025    ALT 25 07/22/2025             ASSESSMENT & PLAN:  1. CLL stage 0  2.  Reflux  3.  Hypertension  4.  Hyperlipidemia  5.  Type 2 diabetes  6.  Elevated ALT with fatty liver on CT  7.  Colon polyps  8.  Granulomatous calcifications in the spleen on CT    Hematology history timeline:  -2/19/2016 CT angiogram chest [facility] states lymph nodes were normal.  Multiple small pulmonary nodules mostly in the upper lung zone 4-5 mm with recommended follow-up in 6-9 months CT abdomen pelvis showed no adenopathy or splenomegaly.  Spleen was described as normal  -3/16/2022 white count 13,650 hemoglobin 14.7 platelets 209,000.  Absolute lymphocyte count 9960.  CT angiogram chest shows mild bilateral axillary adenopathy right axilla 1.8 x 1.3 cm node, left axilla 2.6 x 2 cm node.  No pulmonary embolus.  Doppler venous imaging negative.  -1/22/2024 CT chest low-dose cancer screening compared to 3/16/2022 shows medial left apical lung scarring.  No mediastinal mass.  Right epicardial lymph node increased from 5 mm now 10 mm.  A couple of right lower epicardial nodes largest previously 12 mm now 14 mm.  Smaller more rounded nodule previously 8 mm now 11 mm.  Axillary adenopathy gradually enlarged.  Right sided node 14 x 18 mm previously now 17 x 21 mm.  Diminished or absent hilar fat suggesting reactive or infiltrative  nodes.  Diffuse fatty liver change.  Some shotty retroperitoneal nodes may be present.  Bones normal.  Lung RADS 1 for lung cancer but could have low-grade lymphoproliferative adenopathy.  -1/29/2024 white count 24,500 hemoglobin 15.9 platelets 205,000.  Absolute lymphocyte count 17,930.  Sedimentation rate 7.  LDH normal 197.  Glucose 117 BUN 25 bicarb 21.7 ALT 42 otherwise unremarkable CMP.  ACE level normal 17.    -2/16/2024 [facility] consult: Patient has asymptomatic slowly enlarging adenopathy on screening CT of the chest for lung cancer lung RADS 1.  With this and has rising lymphocyte count as documented above.  No anemia or thrombocytopenia.  No effusions.  No splenomegaly.  No frequent infections or bed drenching night sweats or unexplained fevers or chills.  May well be CLL but we will get his integrated oncology testing performed and I will see him back but even if we confirm this I will not treat him unless he reaches the above criteria.  I will see him back in a few weeks to go over all this.    -2/16/2024 White count 23,880 with hemoglobin 15 platelets 196,000 with absolute lymphocyte count 17,220.  He met a pathology review shows lymphocytosis and 72% of white blood cells with abnormal CD5 clone consistent with CLL.  Hemoglobin 15 platelets 198,000.  Flow cytometry shows abnormal CD5 B-cell population 50% of leukocytes consistent with chronic lymphocytic leukemia/small lymphocytic lymphoma immunophenotype.  CLL FISH panel showed deletion 13 q. but no other genetic aberrancy.  Immunoglobulin variable heavy chain sequencing did not yield interpretable results  ZAP70 positive/CD38 negative/CD49 negative.  P53 negative DNA sequencing.  Clonal immunoglobulin heavy chain and kappa light chain gene rearrangement detected as well as clonal T-cell receptor beta gene rearrangement detected.  No BCL1 or Bcl-2 gene rearrangement.  SNP MicroArray testing showed CLL related clone detected with 2.55 MB of  "interstitial deletion of 13 q. 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 loss of heterozygosity and gain of chromosome 21.  Patients with 13 q. deletion is still normally have longer survival compared to other prognostic groups.  By allelic loss which he has does not confer any additional prognostic information and is the same as mono allelic loss.    -3/15/2024 [facility] hematology consult: The above data indicates he has stage 0 CLL with fairly good prognostic features.  For now there is no indication for treatment such as…  Hemoglobin less than 10 not hemolytic  Platelets less than 100,000 not ITP  10 cm or greater nodes or symptomatic nodes  Splenomegaly  Unexplained B symptoms  Frequent infections  Symptomatic effusions  For now we will go with watchful waiting and he will see my nurse practitioner every 3 months for the next year to see with the jerrica of this is.  If he has absolute lymphocyte count doubling time less than 6 months that can be a soft indication for institution of therapy.    -7/15/2024 [facility] Hematology/Oncology clinic follow-up:  Elan is doing well, CBC overall is stable.  White count is a little higher at 30,850 with absolute lymphocytes 26.19, CBC is otherwise normal, no anemia hemoglobin 14.1, hematocrit 42.6%, platelet count is normal at 190,000.  He has no palpable lymphadenopathy.  He has no \"B symptoms\".  We will continue surveillance with repeat CBC in 3 months and I have ordered that today.     -10/7/2024 [facility] hematology/oncology clinic follow-up: CBC WBC 33.47, hemoglobin 14.6, hematocrit 43.4%, platelet count 189,000, absolute lymphocytes 28.67.  Repeat in 3 months.    -1/14/2025 [facility] hematology clinic follow-up: doing well.  CBC is stable, he has no signs of progressive CLL. WBC is 29.08, hemoglobin 15.1, hematocrit 44.9%, platelet count normal at 207,000, absolute lymphocytes 24.09 with ANC of 4.02.  He has no lymphadenopathy.  No frequent illnesses or B symptoms.  We will continue " surveillance with CBC again in 6 months on return.  - 7/22/2025 white count stable 31,470 with normal hemoglobin and platelets with absolute lymphocyte count stable 24,860.    - 7/24/2025 [facility] hematology follow-up: CBC stable.  No progression of his stage 0 CLL.  No adenopathy or B symptoms or other indications for treatment.  Repeat CBC prior to return to nurse practitioner 6 months    Total time of care today inclusive of time spent today prior to patient's arrival reviewing interval data and during visit translating to patient putting forth plan as outlined took 35 minutes patient care time throughout the day today.  Son Mcdonough MD    07/24/2025

## 2025-07-24 NOTE — PROGRESS NOTES
CHIEF COMPLAINT: No new somatic complaints    Problem List:  Oncology/Hematology History Overview Note   1. CLL stage 0  2.  Reflux  3.  Hypertension  4.  Hyperlipidemia  5.  Type 2 diabetes  6.  Elevated ALT with fatty liver on CT  7.  Colon polyps  8.  Granulomatous calcifications in the spleen on CT    Hematology history timeline:  -2/19/2016 CT angiogram chest [facility] states lymph nodes were normal.  Multiple small pulmonary nodules mostly in the upper lung zone 4-5 mm with recommended follow-up in 6-9 months CT abdomen pelvis showed no adenopathy or splenomegaly.  Spleen was described as normal  -3/16/2022 white count 13,650 hemoglobin 14.7 platelets 209,000.  Absolute lymphocyte count 9960.  CT angiogram chest shows mild bilateral axillary adenopathy right axilla 1.8 x 1.3 cm node, left axilla 2.6 x 2 cm node.  No pulmonary embolus.  Doppler venous imaging negative.  -1/22/2024 CT chest low-dose cancer screening compared to 3/16/2022 shows medial left apical lung scarring.  No mediastinal mass.  Right epicardial lymph node increased from 5 mm now 10 mm.  A couple of right lower epicardial nodes largest previously 12 mm now 14 mm.  Smaller more rounded nodule previously 8 mm now 11 mm.  Axillary adenopathy gradually enlarged.  Right sided node 14 x 18 mm previously now 17 x 21 mm.  Diminished or absent hilar fat suggesting reactive or infiltrative nodes.  Diffuse fatty liver change.  Some shotty retroperitoneal nodes may be present.  Bones normal.  Lung RADS 1 for lung cancer but could have low-grade lymphoproliferative adenopathy.  -1/29/2024 white count 24,500 hemoglobin 15.9 platelets 205,000.  Absolute lymphocyte count 17,930.  Sedimentation rate 7.  LDH normal 197.  Glucose 117 BUN 25 bicarb 21.7 ALT 42 otherwise unremarkable CMP.  ACE level normal 17.    -2/16/2024 [facility] hematology consult: Patient has asymptomatic slowly enlarging adenopathy on screening CT of the chest for lung cancer  lung RADS 1.  With this and has rising lymphocyte count as documented above.  No anemia or thrombocytopenia.  No effusions.  No splenomegaly.  No frequent infections or bed drenching night sweats or unexplained fevers or chills.  May well be CLL but we will get his integrated oncology testing performed and I will see him back but even if we confirm this I will not treat him unless he reaches the above criteria.  I will see him back in a few weeks to go over all this.    -2/16/2024 White count 23,880 with hemoglobin 15 platelets 196,000 with absolute lymphocyte count 17,220.  He met a pathology review shows lymphocytosis and 72% of white blood cells with abnormal CD5 clone consistent with CLL.  Hemoglobin 15 platelets 198,000.  Flow cytometry shows abnormal CD5 B-cell population 50% of leukocytes consistent with chronic lymphocytic leukemia/small lymphocytic lymphoma immunophenotype.  CLL FISH panel showed deletion 13 q. but no other genetic aberrancy.  Immunoglobulin variable heavy chain sequencing did not yield interpretable results  ZAP70 positive/CD38 negative/CD49 negative.  P53 negative DNA sequencing.  Clonal immunoglobulin heavy chain and kappa light chain gene rearrangement detected as well as clonal T-cell receptor beta gene rearrangement detected.  No BCL1 or Bcl-2 gene rearrangement.  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 loss of heterozygosity and gain of chromosome 21.  Patients with 13 q. deletion is still normally have longer survival compared to other prognostic groups.  By allelic loss which he has does not confer any additional prognostic information and is the same as mono allelic loss.    -3/15/2024 [facility] hematology consult: The above data indicates he has stage 0 CLL with fairly good prognostic features.  For now there is no indication for treatment such as…  Hemoglobin less than 10 not  "hemolytic  Platelets less than 100,000 not ITP  10 cm or greater nodes or symptomatic nodes  Splenomegaly  Unexplained B symptoms  Frequent infections  Symptomatic effusions  For now we will go with watchful waiting and he will see my nurse practitioner every 3 months for the next year to see with the jerrica of this is.  If he has absolute lymphocyte count doubling time less than 6 months that can be a soft indication for institution of therapy.    -7/15/2024 [facility] Hematology/Oncology clinic follow-up:  Elan is doing well, CBC overall is stable.  White count is a little higher at 30,850 with absolute lymphocytes 26.19, CBC is otherwise normal, no anemia hemoglobin 14.1, hematocrit 42.6%, platelet count is normal at 190,000.  He has no palpable lymphadenopathy.  He has no \"B symptoms\".  We will continue surveillance with repeat CBC in 3 months and I have ordered that today.     -10/7/2024 [facility] hematology/oncology clinic follow-up: CBC WBC 33.47, hemoglobin 14.6, hematocrit 43.4%, platelet count 189,000, absolute lymphocytes 28.67.  Repeat in 3 months.    -1/14/2025 [facility] hematology clinic follow-up: doing well.  CBC is stable, he has no signs of progressive CLL. WBC is 29.08, hemoglobin 15.1, hematocrit 44.9%, platelet count normal at 207,000, absolute lymphocytes 24.09 with ANC of 4.02.  He has no lymphadenopathy.  No frequent illnesses or B symptoms.  We will continue surveillance with CBC again in 6 months on return.  - 7/22/2025 white count stable 31,470 with normal hemoglobin and platelets with absolute lymphocyte count stable 24,860.    - 7/24/2025 [facility] hematology follow-up: CBC stable.  No progression of his stage 0 CLL.  No adenopathy or B symptoms or other indications for treatment.  Repeat CBC prior to return to nurse practitioner 6 months     CLL (chronic lymphocytic leukemia)   3/15/2024 Initial Diagnosis    CLL (chronic lymphocytic leukemia)         HISTORY OF PRESENT ILLNESS:  The patient is " "a 60 y.o. male, here for follow up on management of CLL stage 0 on surveillance    Past Medical History:   Diagnosis Date    Anxiety     Arthritis     Cancer Feb 2024p    CLL    Essential hypertension 03/18/2022    JACK (generalized anxiety disorder) 03/18/2022    Gastroesophageal reflux disease without esophagitis 11/20/2023    History of COVID-19 03/18/2022    Mixed hyperlipidemia 11/21/2023    Peripheral neuropathy Feb 2024    Primary osteoarthritis of both shoulders 11/22/2023    Type 2 diabetes mellitus without complication, without long-term current use of insulin 11/21/2023 11/2023 A1c 6.7     Past Surgical History:   Procedure Laterality Date    NASAL SINUS SURGERY  2024       No Known Allergies    Family History and Social History reviewed and changed as necessary    REVIEW OF SYSTEM:   No bed drenching night sweats or unexplained weight loss.  No frequent infections.    PHYSICAL EXAM:  No jaundice icterus or pallor.  No respiratory distress.    Vitals:    07/24/25 0844   BP: 131/81   Pulse: 78   Resp: 18   Temp: 97.8 °F (36.6 °C)   SpO2: 95%   Weight: 93.9 kg (207 lb)   Height: 177.8 cm (70\")     Vitals:    07/24/25 0844   PainSc: 0-No pain          ECOG score: 0           Vitals reviewed.    ECOG: (0) Fully Active - Able to Carry On All Pre-disease Performance Without Restriction    Lab Results   Component Value Date    HGB 15.6 07/22/2025    HCT 46.8 07/22/2025    MCV 91.2 07/22/2025     07/22/2025    WBC 31.47 (C) 07/22/2025    NEUTROABS 5.04 07/22/2025    LYMPHSABS 24.09 (H) 01/14/2025    MONOSABS 0.70 01/14/2025    EOSABS 0.00 07/22/2025    BASOSABS 0.63 (H) 07/22/2025       Lab Results   Component Value Date    GLUCOSE 88 07/22/2025    BUN 24.0 (H) 07/22/2025    CREATININE 1.54 (H) 07/22/2025     07/22/2025    K 4.5 07/22/2025     07/22/2025    CO2 25.7 07/22/2025    CALCIUM 10.1 07/22/2025    PROTEINTOT 7.7 07/22/2025    ALBUMIN 4.9 07/22/2025    BILITOT 0.5 07/22/2025    " ALKPHOS 46 07/22/2025    AST 26 07/22/2025    ALT 25 07/22/2025             ASSESSMENT & PLAN:  1. CLL stage 0  2.  Reflux  3.  Hypertension  4.  Hyperlipidemia  5.  Type 2 diabetes  6.  Elevated ALT with fatty liver on CT  7.  Colon polyps  8.  Granulomatous calcifications in the spleen on CT    Hematology history timeline:  -2/19/2016 CT angiogram chest [facility] states lymph nodes were normal.  Multiple small pulmonary nodules mostly in the upper lung zone 4-5 mm with recommended follow-up in 6-9 months CT abdomen pelvis showed no adenopathy or splenomegaly.  Spleen was described as normal  -3/16/2022 white count 13,650 hemoglobin 14.7 platelets 209,000.  Absolute lymphocyte count 9960.  CT angiogram chest shows mild bilateral axillary adenopathy right axilla 1.8 x 1.3 cm node, left axilla 2.6 x 2 cm node.  No pulmonary embolus.  Doppler venous imaging negative.  -1/22/2024 CT chest low-dose cancer screening compared to 3/16/2022 shows medial left apical lung scarring.  No mediastinal mass.  Right epicardial lymph node increased from 5 mm now 10 mm.  A couple of right lower epicardial nodes largest previously 12 mm now 14 mm.  Smaller more rounded nodule previously 8 mm now 11 mm.  Axillary adenopathy gradually enlarged.  Right sided node 14 x 18 mm previously now 17 x 21 mm.  Diminished or absent hilar fat suggesting reactive or infiltrative nodes.  Diffuse fatty liver change.  Some shotty retroperitoneal nodes may be present.  Bones normal.  Lung RADS 1 for lung cancer but could have low-grade lymphoproliferative adenopathy.  -1/29/2024 white count 24,500 hemoglobin 15.9 platelets 205,000.  Absolute lymphocyte count 17,930.  Sedimentation rate 7.  LDH normal 197.  Glucose 117 BUN 25 bicarb 21.7 ALT 42 otherwise unremarkable CMP.  ACE level normal 17.    -2/16/2024 [facility] hematology consult: Patient has asymptomatic slowly enlarging adenopathy on screening CT of the chest for lung cancer lung RADS  1.  With this and has rising lymphocyte count as documented above.  No anemia or thrombocytopenia.  No effusions.  No splenomegaly.  No frequent infections or bed drenching night sweats or unexplained fevers or chills.  May well be CLL but we will get his integrated oncology testing performed and I will see him back but even if we confirm this I will not treat him unless he reaches the above criteria.  I will see him back in a few weeks to go over all this.    -2/16/2024 White count 23,880 with hemoglobin 15 platelets 196,000 with absolute lymphocyte count 17,220.  He met a pathology review shows lymphocytosis and 72% of white blood cells with abnormal CD5 clone consistent with CLL.  Hemoglobin 15 platelets 198,000.  Flow cytometry shows abnormal CD5 B-cell population 50% of leukocytes consistent with chronic lymphocytic leukemia/small lymphocytic lymphoma immunophenotype.  CLL FISH panel showed deletion 13 q. but no other genetic aberrancy.  Immunoglobulin variable heavy chain sequencing did not yield interpretable results  ZAP70 positive/CD38 negative/CD49 negative.  P53 negative DNA sequencing.  Clonal immunoglobulin heavy chain and kappa light chain gene rearrangement detected as well as clonal T-cell receptor beta gene rearrangement detected.  No BCL1 or Bcl-2 gene rearrangement.  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 loss of heterozygosity and gain of chromosome 21.  Patients with 13 q. deletion is still normally have longer survival compared to other prognostic groups.  By allelic loss which he has does not confer any additional prognostic information and is the same as mono allelic loss.    -3/15/2024 [facility] hematology consult: The above data indicates he has stage 0 CLL with fairly good prognostic features.  For now there is no indication for treatment such as…  Hemoglobin less than 10 not hemolytic  Platelets  "less than 100,000 not ITP  10 cm or greater nodes or symptomatic nodes  Splenomegaly  Unexplained B symptoms  Frequent infections  Symptomatic effusions  For now we will go with watchful waiting and he will see my nurse practitioner every 3 months for the next year to see with the jerrica of this is.  If he has absolute lymphocyte count doubling time less than 6 months that can be a soft indication for institution of therapy.    -7/15/2024 [facility] [place] Hematology/Oncology clinic follow-up:  Elan is doing well, CBC overall is stable.  White count is a little higher at 30,850 with absolute lymphocytes 26.19, CBC is otherwise normal, no anemia hemoglobin 14.1, hematocrit 42.6%, platelet count is normal at 190,000.  He has no palpable lymphadenopathy.  He has no \"B symptoms\".  We will continue surveillance with repeat CBC in 3 months and I have ordered that today.     -10/7/2024 [facility] [place] hematology/oncology clinic follow-up: CBC WBC 33.47, hemoglobin 14.6, hematocrit 43.4%, platelet count 189,000, absolute lymphocytes 28.67.  Repeat in 3 months.    -1/14/2025 [facility] [place] hematology clinic follow-up: doing well.  CBC is stable, he has no signs of progressive CLL. WBC is 29.08, hemoglobin 15.1, hematocrit 44.9%, platelet count normal at 207,000, absolute lymphocytes 24.09 with ANC of 4.02.  He has no lymphadenopathy.  No frequent illnesses or B symptoms.  We will continue surveillance with CBC again in 6 months on return.  - 7/22/2025 white count stable 31,470 with normal hemoglobin and platelets with absolute lymphocyte count stable 24,860.    - 7/24/2025 [facility] [place] hematology follow-up: CBC stable.  No progression of his stage 0 CLL.  No adenopathy or B symptoms or other indications for treatment.  Repeat CBC prior to return to nurse practitioner 6 months    Total time of care today inclusive of time spent today prior to patient's arrival reviewing interval data and during visit translating to patient putting forth plan as outlined " took 35 minutes patient care time throughout the day today.  Son Mcdonough MD    07/24/2025

## 2025-08-14 DIAGNOSIS — I10 ESSENTIAL HYPERTENSION: Chronic | ICD-10-CM

## 2025-08-14 DIAGNOSIS — R20.0 NUMBNESS AND TINGLING: ICD-10-CM

## 2025-08-14 DIAGNOSIS — E11.9 TYPE 2 DIABETES MELLITUS WITHOUT COMPLICATION, WITHOUT LONG-TERM CURRENT USE OF INSULIN: Chronic | ICD-10-CM

## 2025-08-14 DIAGNOSIS — E78.2 MIXED HYPERLIPIDEMIA: Chronic | ICD-10-CM

## 2025-08-14 DIAGNOSIS — R20.2 NUMBNESS AND TINGLING: ICD-10-CM

## 2025-08-15 RX ORDER — SEMAGLUTIDE 0.68 MG/ML
0.5 INJECTION, SOLUTION SUBCUTANEOUS WEEKLY
Qty: 9 ML | Refills: 0 | Status: SHIPPED | OUTPATIENT
Start: 2025-08-15

## 2025-08-15 RX ORDER — METFORMIN HYDROCHLORIDE 500 MG/1
500 TABLET, EXTENDED RELEASE ORAL
Qty: 90 TABLET | Refills: 0 | Status: SHIPPED | OUTPATIENT
Start: 2025-08-15

## 2025-08-15 RX ORDER — MULTIVITAMIN WITH IRON
100 TABLET ORAL DAILY
Qty: 90 TABLET | Refills: 1 | OUTPATIENT
Start: 2025-08-15

## 2025-08-15 RX ORDER — ROSUVASTATIN CALCIUM 5 MG/1
5 TABLET, COATED ORAL DAILY
Qty: 30 TABLET | Refills: 5 | Status: SHIPPED | OUTPATIENT
Start: 2025-08-15

## 2025-08-15 RX ORDER — HYDROCHLOROTHIAZIDE 12.5 MG/1
12.5 CAPSULE ORAL DAILY
Qty: 90 CAPSULE | Refills: 0 | Status: SHIPPED | OUTPATIENT
Start: 2025-08-15